# Patient Record
Sex: FEMALE | Race: WHITE | Employment: FULL TIME | URBAN - NONMETROPOLITAN AREA
[De-identification: names, ages, dates, MRNs, and addresses within clinical notes are randomized per-mention and may not be internally consistent; named-entity substitution may affect disease eponyms.]

---

## 2019-08-08 ENCOUNTER — TELEPHONE (OUTPATIENT)
Dept: OPTOMETRY | Age: 29
End: 2019-08-08

## 2019-09-16 ENCOUNTER — OFFICE VISIT (OUTPATIENT)
Dept: OPTOMETRY | Age: 29
End: 2019-09-16
Payer: COMMERCIAL

## 2019-09-16 DIAGNOSIS — H52.13 MYOPIA OF BOTH EYES WITH ASTIGMATISM: Primary | ICD-10-CM

## 2019-09-16 DIAGNOSIS — H52.203 MYOPIA OF BOTH EYES WITH ASTIGMATISM: Primary | ICD-10-CM

## 2019-09-16 PROCEDURE — 92004 COMPRE OPH EXAM NEW PT 1/>: CPT | Performed by: OPTOMETRIST

## 2019-09-16 ASSESSMENT — VISUAL ACUITY
OS_CC: 20/20
METHOD: SNELLEN - LINEAR
CORRECTION_TYPE: GLASSES
OD_CC+: -1

## 2019-09-16 ASSESSMENT — REFRACTION_MANIFEST
OD_SPHERE: -4.00
OD_AXIS: 180
OS_CYLINDER: -2.00
OS_SPHERE: -4.00
OS_AXIS: 170
OD_CYLINDER: -1.50

## 2019-09-16 ASSESSMENT — REFRACTION_WEARINGRX
OS_CYLINDER: -1.50
OD_CYLINDER: -1.25
SPECS_TYPE: SVL
OD_AXIS: 002
OS_AXIS: 169
OS_SPHERE: -4.00
OD_SPHERE: -3.75

## 2019-09-16 ASSESSMENT — REFRACTION_CURRENTRX
OD_SPHERE: -4.00
OD_BRAND: BIOFINITY TORIC
OS_AXIS: 170
OD_CYLINDER: -1.25
OS_BASECURVE: 8.7
OS_BRAND: BIOFINITY TORIC
OS_SPHERE: -4.00
OS_CYLINDER: -1.75
OD_BASECURVE: 8.7
OD_AXIS: 180

## 2019-09-16 ASSESSMENT — TONOMETRY
OD_IOP_MMHG: 19
IOP_METHOD: NON-CONTACT AIR PUFF
OS_IOP_MMHG: 17

## 2019-09-16 ASSESSMENT — SLIT LAMP EXAM - LIDS
COMMENTS: NORMAL
COMMENTS: NORMAL

## 2019-09-16 NOTE — PROGRESS NOTES
Topics Concern    None   Social History Narrative    None     History reviewed. No pertinent past medical history.     Neuro/Psych     Neuro/Psych     Oriented x3:  Yes    Mood/Affect:  Normal                Main Ophthalmology Exam     External Exam       Right Left    External Normal Normal          Slit Lamp Exam       Right Left    Lids/Lashes Normal Normal    Conjunctiva/Sclera White and quiet White and quiet    Cornea Clear Clear    Anterior Chamber Deep and quiet Deep and quiet    Iris Round and reactive Round and reactive    Lens Clear Clear    Vitreous Normal Normal          Fundus Exam       Right Left    Disc Normal Normal    C/D Ratio 0.1 0.1    Macula Normal Normal    Vessels Normal Normal    78 diopter                   Tonometry     Tonometry (Non-contact air puff, 1:12 PM)       Right Left    Pressure 19 17   IOP.9             17.2  CH:  10.4          10.4  WS: 6.9          8.6                     Visual Acuity (Snellen - Linear)       Right Left    Dist cc 20/25 -1 20/20    Correction:  Glasses         Not recorded          Ophthalmology Exam     Wearing Rx       Sphere Cylinder Axis    Right -3.75 -1.25 002    Left -4.00 -1.50 169    Age:  3yr    Type:  SVL                Manifest Refraction     Manifest Refraction       Sphere Cylinder Secor Dist VA    Right -4.00 -1.50 180 20/20    Left -4.00 -2.00 170 20/20          Manifest Refraction #2 (Auto)       Sphere Cylinder Secor Dist VA    Right -4.00 -1.75 179     Left -4.00 -2.25 169                  Final Rx       Sphere Cylinder Axis    Right -4.00 -1.50 180    Left -4.00 -2.00 170    Type:  SVL    Expiration Date:  2021           Contact Lens Current Rx     Current Contact Lens Rx (Ordered)       Brand Base Curve Sphere Cylinder Axis    Right Biofinity Toric 8.7 -4.00 -1.25 180    Left Biofinity Toric 8.7 -4.00 -1.75 170                FINAL   Final Contact Lens Rx       Brand Base Curve Sphere Cylinder Axis    Right Biofinity Toric 8.7

## 2019-11-13 LAB
ALT SERPL-CCNC: 23 U/L
AST SERPL-CCNC: 14 U/L
AVERAGE GLUCOSE: 111
BASOPHILS ABSOLUTE: NORMAL
BASOPHILS RELATIVE PERCENT: NORMAL
BUN BLDV-MCNC: 16 MG/DL
CALCIUM SERPL-MCNC: 9.2 MG/DL
CHLORIDE BLD-SCNC: 101 MMOL/L
CHOLESTEROL, TOTAL: 139 MG/DL
CHOLESTEROL/HDL RATIO: 3.7
CO2: 24 MMOL/L
CREAT SERPL-MCNC: 0.67 MG/DL
EOSINOPHILS ABSOLUTE: NORMAL
EOSINOPHILS RELATIVE PERCENT: NORMAL
GFR CALCULATED: >60
GLUCOSE BLD-MCNC: 90 MG/DL
HBA1C MFR BLD: 5.5 %
HCT VFR BLD CALC: 40.7 % (ref 36–46)
HDLC SERPL-MCNC: 38 MG/DL (ref 35–70)
HEMOGLOBIN: 13.1 G/DL (ref 12–16)
LDL CHOLESTEROL CALCULATED: 86 MG/DL (ref 0–160)
LYMPHOCYTES ABSOLUTE: NORMAL
LYMPHOCYTES RELATIVE PERCENT: NORMAL
MCH RBC QN AUTO: 26.2 PG
MCHC RBC AUTO-ENTMCNC: 32.3 G/DL
MCV RBC AUTO: 81.1 FL
MONOCYTES ABSOLUTE: NORMAL
MONOCYTES RELATIVE PERCENT: NORMAL
NEUTROPHILS ABSOLUTE: NORMAL
NEUTROPHILS RELATIVE PERCENT: NORMAL
PLATELET # BLD: 250 K/ΜL
PMV BLD AUTO: 9.1 FL
POTASSIUM SERPL-SCNC: 3.9 MMOL/L
RBC # BLD: 5.02 10^6/ΜL
SODIUM BLD-SCNC: 136 MMOL/L
TRIGL SERPL-MCNC: 76 MG/DL
TSH SERPL DL<=0.05 MIU/L-ACNC: 1.63 UIU/ML
VLDLC SERPL CALC-MCNC: NORMAL MG/DL
WBC # BLD: 11.3 10^3/ML

## 2019-11-15 ENCOUNTER — OFFICE VISIT (OUTPATIENT)
Dept: PRIMARY CARE CLINIC | Age: 29
End: 2019-11-15
Payer: COMMERCIAL

## 2019-11-15 ENCOUNTER — HOSPITAL ENCOUNTER (OUTPATIENT)
Age: 29
Setting detail: SPECIMEN
Discharge: HOME | End: 2019-11-15
Payer: COMMERCIAL

## 2019-11-15 VITALS
HEART RATE: 79 BPM | HEIGHT: 63 IN | BODY MASS INDEX: 34.2 KG/M2 | DIASTOLIC BLOOD PRESSURE: 70 MMHG | OXYGEN SATURATION: 99 % | SYSTOLIC BLOOD PRESSURE: 114 MMHG | WEIGHT: 193 LBS | RESPIRATION RATE: 16 BRPM | TEMPERATURE: 98.7 F

## 2019-11-15 DIAGNOSIS — J02.9 SORE THROAT: Primary | ICD-10-CM

## 2019-11-15 DIAGNOSIS — J02.9 SORE THROAT: ICD-10-CM

## 2019-11-15 LAB — S PYO AG THROAT QL: NORMAL

## 2019-11-15 PROCEDURE — 99213 OFFICE O/P EST LOW 20 MIN: CPT | Performed by: PHYSICIAN ASSISTANT

## 2019-11-15 PROCEDURE — 87880 STREP A ASSAY W/OPTIC: CPT | Performed by: PHYSICIAN ASSISTANT

## 2019-11-15 PROCEDURE — 87651 STREP A DNA AMP PROBE: CPT

## 2019-11-15 ASSESSMENT — ENCOUNTER SYMPTOMS
DIARRHEA: 0
COUGH: 1
WHEEZING: 0
TROUBLE SWALLOWING: 1
VOMITING: 0
SORE THROAT: 1
RHINORRHEA: 1
NAUSEA: 0
SHORTNESS OF BREATH: 0
CHEST TIGHTNESS: 0

## 2019-11-15 ASSESSMENT — PATIENT HEALTH QUESTIONNAIRE - PHQ9
SUM OF ALL RESPONSES TO PHQ9 QUESTIONS 1 & 2: 0
1. LITTLE INTEREST OR PLEASURE IN DOING THINGS: 0
SUM OF ALL RESPONSES TO PHQ QUESTIONS 1-9: 0
2. FEELING DOWN, DEPRESSED OR HOPELESS: 0
SUM OF ALL RESPONSES TO PHQ QUESTIONS 1-9: 0

## 2019-11-16 LAB
DIRECT EXAM: NORMAL
Lab: NORMAL
SPECIMEN DESCRIPTION: NORMAL

## 2019-11-18 ENCOUNTER — OFFICE VISIT (OUTPATIENT)
Dept: FAMILY MEDICINE CLINIC | Age: 29
End: 2019-11-18
Payer: COMMERCIAL

## 2019-11-18 ENCOUNTER — HOSPITAL ENCOUNTER (OUTPATIENT)
Dept: LAB | Age: 29
Discharge: HOME | End: 2019-11-18
Payer: COMMERCIAL

## 2019-11-18 VITALS
OXYGEN SATURATION: 99 % | BODY MASS INDEX: 34.02 KG/M2 | HEIGHT: 63 IN | WEIGHT: 192 LBS | HEART RATE: 71 BPM | SYSTOLIC BLOOD PRESSURE: 118 MMHG | DIASTOLIC BLOOD PRESSURE: 82 MMHG

## 2019-11-18 DIAGNOSIS — R53.83 FATIGUE, UNSPECIFIED TYPE: Primary | ICD-10-CM

## 2019-11-18 DIAGNOSIS — R53.83 FATIGUE, UNSPECIFIED TYPE: ICD-10-CM

## 2019-11-18 DIAGNOSIS — B00.1 HERPES LABIALIS: ICD-10-CM

## 2019-11-18 DIAGNOSIS — Z76.89 ENCOUNTER TO ESTABLISH CARE: ICD-10-CM

## 2019-11-18 LAB — VITAMIN D 25-HYDROXY: 24.7 NG/ML (ref 30–100)

## 2019-11-18 PROCEDURE — 99214 OFFICE O/P EST MOD 30 MIN: CPT | Performed by: NURSE PRACTITIONER

## 2019-11-18 PROCEDURE — 82306 VITAMIN D 25 HYDROXY: CPT

## 2019-11-18 PROCEDURE — 36415 COLL VENOUS BLD VENIPUNCTURE: CPT

## 2019-11-18 RX ORDER — CLOTRIMAZOLE AND BETAMETHASONE DIPROPIONATE 10; .64 MG/G; MG/G
CREAM TOPICAL
Qty: 1 TUBE | Refills: 1 | Status: SHIPPED | OUTPATIENT
Start: 2019-11-18

## 2019-11-18 RX ORDER — VALACYCLOVIR HYDROCHLORIDE 1 G/1
TABLET, FILM COATED ORAL
Qty: 4 TABLET | Refills: 3 | Status: SHIPPED | OUTPATIENT
Start: 2019-11-18 | End: 2020-06-11

## 2019-11-18 ASSESSMENT — ENCOUNTER SYMPTOMS
COUGH: 0
DIARRHEA: 0
NAUSEA: 0
VOMITING: 0
SHORTNESS OF BREATH: 0
WHEEZING: 0

## 2019-11-19 DIAGNOSIS — E55.9 VITAMIN D DEFICIENCY: Primary | ICD-10-CM

## 2019-12-11 ENCOUNTER — E-VISIT (OUTPATIENT)
Dept: FAMILY MEDICINE CLINIC | Age: 29
End: 2019-12-11
Payer: COMMERCIAL

## 2019-12-11 PROCEDURE — 99444 PR PHYSICIAN ONLINE EVALUATION & MANAGEMENT SERVICE: CPT | Performed by: NURSE PRACTITIONER

## 2019-12-11 RX ORDER — AZITHROMYCIN 250 MG/1
TABLET, FILM COATED ORAL
Qty: 1 PACKET | Refills: 0 | Status: SHIPPED | OUTPATIENT
Start: 2019-12-11 | End: 2019-12-16

## 2019-12-11 ASSESSMENT — LIFESTYLE VARIABLES: SMOKING_STATUS: NO, I'VE NEVER SMOKED

## 2019-12-16 ENCOUNTER — PATIENT MESSAGE (OUTPATIENT)
Dept: FAMILY MEDICINE CLINIC | Age: 29
End: 2019-12-16

## 2019-12-16 DIAGNOSIS — B00.1 HERPES LABIALIS: Primary | ICD-10-CM

## 2019-12-16 RX ORDER — VALACYCLOVIR HYDROCHLORIDE 1 G/1
1000 TABLET, FILM COATED ORAL 2 TIMES DAILY
Qty: 20 TABLET | Refills: 0 | Status: SHIPPED | OUTPATIENT
Start: 2019-12-16 | End: 2019-12-26

## 2019-12-31 ENCOUNTER — E-VISIT (OUTPATIENT)
Dept: FAMILY MEDICINE CLINIC | Age: 29
End: 2019-12-31
Payer: COMMERCIAL

## 2019-12-31 PROCEDURE — 98969 PR NONPHYSICIAN ONLINE ASSESSMENT AND MANAGEMENT: CPT | Performed by: NURSE PRACTITIONER

## 2020-01-02 RX ORDER — OMEPRAZOLE 20 MG/1
20 CAPSULE, DELAYED RELEASE ORAL DAILY
Qty: 30 CAPSULE | Refills: 11 | Status: SHIPPED | OUTPATIENT
Start: 2020-01-02 | End: 2021-05-25

## 2020-01-02 RX ORDER — OMEPRAZOLE 20 MG/1
20 CAPSULE, DELAYED RELEASE ORAL DAILY
Qty: 30 CAPSULE | Refills: 11 | Status: SHIPPED | OUTPATIENT
Start: 2020-01-02 | End: 2020-01-02 | Stop reason: CLARIF

## 2020-01-13 ENCOUNTER — PATIENT MESSAGE (OUTPATIENT)
Dept: FAMILY MEDICINE CLINIC | Age: 30
End: 2020-01-13

## 2020-01-13 RX ORDER — VALACYCLOVIR HYDROCHLORIDE 1 G/1
TABLET, FILM COATED ORAL
Qty: 4 TABLET | Refills: 3 | Status: CANCELLED | OUTPATIENT
Start: 2020-01-13

## 2020-01-13 RX ORDER — VALACYCLOVIR HYDROCHLORIDE 500 MG/1
500 TABLET, FILM COATED ORAL DAILY
Qty: 90 TABLET | Refills: 3 | Status: SHIPPED | OUTPATIENT
Start: 2020-01-13 | End: 2020-09-29 | Stop reason: SDUPTHER

## 2020-02-07 ENCOUNTER — NURSE ONLY (OUTPATIENT)
Dept: LAB | Age: 30
End: 2020-02-07
Payer: COMMERCIAL

## 2020-02-07 PROCEDURE — 96372 THER/PROPH/DIAG INJ SC/IM: CPT | Performed by: NURSE PRACTITIONER

## 2020-02-07 RX ORDER — KETOROLAC TROMETHAMINE 30 MG/ML
30 INJECTION, SOLUTION INTRAMUSCULAR; INTRAVENOUS ONCE
Status: COMPLETED | OUTPATIENT
Start: 2020-02-07 | End: 2020-02-07

## 2020-02-07 RX ADMIN — KETOROLAC TROMETHAMINE 30 MG: 30 INJECTION, SOLUTION INTRAMUSCULAR; INTRAVENOUS at 16:27

## 2020-03-31 ENCOUNTER — TELEMEDICINE (OUTPATIENT)
Dept: FAMILY MEDICINE CLINIC | Age: 30
End: 2020-03-31
Payer: COMMERCIAL

## 2020-03-31 PROCEDURE — 99213 OFFICE O/P EST LOW 20 MIN: CPT | Performed by: NURSE PRACTITIONER

## 2020-03-31 RX ORDER — VALACYCLOVIR HYDROCHLORIDE 1 G/1
TABLET, FILM COATED ORAL
Qty: 4 TABLET | Refills: 3 | Status: SHIPPED
Start: 2020-03-31 | End: 2020-07-10 | Stop reason: DRUGHIGH

## 2020-03-31 ASSESSMENT — ENCOUNTER SYMPTOMS: TROUBLE SWALLOWING: 0

## 2020-03-31 NOTE — PROGRESS NOTES
Not on file     Physically abused: Not on file     Forced sexual activity: Not on file   Other Topics Concern    Not on file   Social History Narrative    Not on file       Family History   Problem Relation Age of Onset    Diabetes Father     Diabetes Paternal Grandmother     Cancer Paternal Grandmother     Diabetes Paternal Grandfather     Thyroid Disease Paternal Grandfather     High Blood Pressure Paternal Grandfather     Cataracts Neg Hx     Glaucoma Neg Hx        No Known Allergies    Current Outpatient Medications   Medication Sig Dispense Refill    valACYclovir (VALTREX) 1 g tablet 2 TABS AT ONSET OF COLD SORE, REPEAT DOSE IN 12 HOURS ONCE 4 tablet 3    valACYclovir (VALTREX) 500 MG tablet Take 1 tablet by mouth daily 90 tablet 3    omeprazole (PRILOSEC) 20 MG delayed release capsule Take 1 capsule by mouth daily 30 capsule 11    clotrimazole-betamethasone (LOTRISONE) 1-0.05 % cream Apply topically 2 times daily. 1 Tube 1    valACYclovir (VALTREX) 1 g tablet 2 TABS AT ONSET OF COLD SORE, REPEAT DOSE IN 12 HOURS ONCE 4 tablet 3    ibuprofen (ADVIL;MOTRIN) 600 MG tablet Take 1 tablet by mouth every 6 hours as needed for Pain 30 tablet 0     No current facility-administered medications for this visit. Review of Systems   Constitutional: Negative for activity change, appetite change and fever. HENT: Negative for dental problem, drooling and trouble swallowing. Cold sore         Prior to Visit Medications    Medication Sig Taking?  Authorizing Provider   valACYclovir (VALTREX) 1 g tablet 2 TABS AT ONSET OF COLD SORE, REPEAT DOSE IN 12 HOURS ONCE Yes LAWANDA Paulino CNP   valACYclovir (VALTREX) 500 MG tablet Take 1 tablet by mouth daily  LAWANDA Paulino CNP   omeprazole (PRILOSEC) 20 MG delayed release capsule Take 1 capsule by mouth daily  LAWANDA Paulino CNP   clotrimazole-betamethasone (LOTRISONE) 1-0.05 % cream Apply topically 2 times daily. Ace Sung, APRN - CNP   valACYclovir (VALTREX) 1 g tablet 2 TABS AT ONSET OF COLD SORE, REPEAT DOSE IN 12 HOURS ONCE  LAWANDA Sylvester CNP   ibuprofen (ADVIL;MOTRIN) 600 MG tablet Take 1 tablet by mouth every 6 hours as needed for Pain  Nathalai Wagoner MD       Social History     Tobacco Use    Smoking status: Never Smoker    Smokeless tobacco: Never Used   Substance Use Topics    Alcohol use: Yes     Comment: occasionally    Drug use: Never            PHYSICAL EXAMINATION:  [ INSTRUCTIONS:  \"[x]\" Indicates a positive item  \"[]\" Indicates a negative item  -- DELETE ALL ITEMS NOT EXAMINED]  Vital Signs: (As obtained by patient/caregiver or practitioner observation)        Constitutional: [x] Appears well-developed and well-nourished [x] No apparent distress        Mental status  [x] Alert and awake  [] Oriented to person/place/time [x]Able to follow commands      Eyes:  EOM    [x]  Normal   Sclera  [x]  Normal           Discharge [x]  None visible      HENT:   [x] Normocephalic, atraumatic.     [x] Mouth/Throat: Mucous membranes are moist.     External Ears [x] Normal      Neck: [x] No visualized mass     Pulmonary/Chest: [x] Respiratory effort normal.  [x] No visualized signs of difficulty breathing or respiratory distress             Musculoskeletal:   [x] Normal gait with no signs of ataxia         [x] Normal range of motion of neck          Neurological:        [x] No Facial Asymmetry (Cranial nerve 7 motor function) (limited exam to video visit)          [x] No gaze palsy            Skin:              [] Abnormal- cold sore noted on upper lip midline           Psychiatric:       [x] Normal Affect [x] No Hallucinations       Other pertinent observable physical exam findings-     Due to this being a TeleHealth encounter, evaluation of the following organ systems is limited:

## 2020-04-23 ENCOUNTER — OFFICE VISIT (OUTPATIENT)
Dept: FAMILY MEDICINE CLINIC | Age: 30
End: 2020-04-23
Payer: COMMERCIAL

## 2020-04-23 VITALS
HEIGHT: 62 IN | SYSTOLIC BLOOD PRESSURE: 132 MMHG | DIASTOLIC BLOOD PRESSURE: 88 MMHG | TEMPERATURE: 97.9 F | HEART RATE: 78 BPM | BODY MASS INDEX: 35.48 KG/M2 | WEIGHT: 192.8 LBS

## 2020-04-23 PROCEDURE — 99213 OFFICE O/P EST LOW 20 MIN: CPT | Performed by: NURSE PRACTITIONER

## 2020-04-23 ASSESSMENT — ENCOUNTER SYMPTOMS
SHORTNESS OF BREATH: 0
VOMITING: 0
NAUSEA: 0
CONSTIPATION: 1
DIARRHEA: 0
ABDOMINAL DISTENTION: 0
COUGH: 0
ABDOMINAL PAIN: 1
BLOATING: 0
BACK PAIN: 0
WHEEZING: 0

## 2020-04-23 ASSESSMENT — PATIENT HEALTH QUESTIONNAIRE - PHQ9
SUM OF ALL RESPONSES TO PHQ QUESTIONS 1-9: 0
SUM OF ALL RESPONSES TO PHQ9 QUESTIONS 1 & 2: 0
SUM OF ALL RESPONSES TO PHQ QUESTIONS 1-9: 0
1. LITTLE INTEREST OR PLEASURE IN DOING THINGS: 0
2. FEELING DOWN, DEPRESSED OR HOPELESS: 0

## 2020-04-23 NOTE — PROGRESS NOTES
Intimate partner violence     Fear of current or ex partner: None     Emotionally abused: None     Physically abused: None     Forced sexual activity: None   Other Topics Concern    None   Social History Narrative    None       Family History   Problem Relation Age of Onset    Diabetes Father     Diabetes Paternal Grandmother     Cancer Paternal Grandmother     Diabetes Paternal Grandfather     Thyroid Disease Paternal Grandfather     High Blood Pressure Paternal Grandfather     Cataracts Neg Hx     Glaucoma Neg Hx        No Known Allergies    Current Outpatient Medications   Medication Sig Dispense Refill    valACYclovir (VALTREX) 1 g tablet 2 TABS AT ONSET OF COLD SORE, REPEAT DOSE IN 12 HOURS ONCE 4 tablet 3    valACYclovir (VALTREX) 500 MG tablet Take 1 tablet by mouth daily 90 tablet 3    omeprazole (PRILOSEC) 20 MG delayed release capsule Take 1 capsule by mouth daily 30 capsule 11    clotrimazole-betamethasone (LOTRISONE) 1-0.05 % cream Apply topically 2 times daily. 1 Tube 1    valACYclovir (VALTREX) 1 g tablet 2 TABS AT ONSET OF COLD SORE, REPEAT DOSE IN 12 HOURS ONCE 4 tablet 3    ibuprofen (ADVIL;MOTRIN) 600 MG tablet Take 1 tablet by mouth every 6 hours as needed for Pain 30 tablet 0     No current facility-administered medications for this visit. Review of Systems   Constitutional: Negative for activity change, appetite change and fever. Respiratory: Negative for cough, shortness of breath and wheezing. Cardiovascular: Negative for chest pain and palpitations. Gastrointestinal: Positive for abdominal pain and constipation. Negative for abdominal distention, bloating, diarrhea, nausea and vomiting. Genitourinary: Negative for difficulty urinating. Musculoskeletal: Negative for back pain. Objective:   Physical Exam  Vitals signs and nursing note reviewed. Constitutional:       Appearance: Normal appearance. She is obese.    HENT:      Head: Normocephalic and atraumatic. Cardiovascular:      Rate and Rhythm: Normal rate and regular rhythm. Heart sounds: Normal heart sounds. Pulmonary:      Effort: Pulmonary effort is normal.      Breath sounds: Normal breath sounds. Abdominal:      General: Bowel sounds are normal.      Tenderness: There is generalized abdominal tenderness. There is no guarding or rebound. Neurological:      Mental Status: She is alert. Assessment:       Diagnosis Orders   1.  Constipation, unspecified constipation type             Plan:      Colace 100mg BID until stools are soft and regular  miralax daily until stools are soft and regular  Push fluids  Exercise daily  May try lactulose if no improvement  Pt to return if symptoms do not improve or worsen   returN PRN         Nadja Roberson, APRN - CNP

## 2020-05-28 ENCOUNTER — E-VISIT (OUTPATIENT)
Dept: FAMILY MEDICINE CLINIC | Age: 30
End: 2020-05-28
Payer: COMMERCIAL

## 2020-05-28 ENCOUNTER — HOSPITAL ENCOUNTER (OUTPATIENT)
Age: 30
Setting detail: SPECIMEN
Discharge: HOME | End: 2020-05-28
Payer: COMMERCIAL

## 2020-05-28 LAB
-: ABNORMAL
AMORPHOUS: ABNORMAL
BACTERIA: ABNORMAL
BILIRUBIN URINE: NEGATIVE
CASTS UA: ABNORMAL /LPF (ref 0–2)
COLOR: ABNORMAL
COMMENT UA: ABNORMAL
CRYSTALS, UA: ABNORMAL /HPF
EPITHELIAL CELLS UA: ABNORMAL /HPF (ref 0–5)
GLUCOSE URINE: NEGATIVE
KETONES, URINE: NEGATIVE
LEUKOCYTE ESTERASE, URINE: NEGATIVE
MUCUS: ABNORMAL
NITRITE, URINE: NEGATIVE
OTHER OBSERVATIONS UA: ABNORMAL
PH UA: 7 (ref 5–6)
PROTEIN UA: NEGATIVE
RBC UA: ABNORMAL /HPF (ref 0–4)
RENAL EPITHELIAL, UA: ABNORMAL /HPF
SPECIFIC GRAVITY UA: 1.02 (ref 1.01–1.02)
TRICHOMONAS: ABNORMAL
TURBIDITY: ABNORMAL
URINE HGB: NEGATIVE
UROBILINOGEN, URINE: NORMAL
WBC UA: ABNORMAL /HPF (ref 0–4)
YEAST: ABNORMAL

## 2020-05-28 PROCEDURE — 81001 URINALYSIS AUTO W/SCOPE: CPT

## 2020-05-28 PROCEDURE — 98970 NQHP OL DIG ASSMT&MGMT 5-10: CPT | Performed by: NURSE PRACTITIONER

## 2020-05-29 RX ORDER — FLUCONAZOLE 150 MG/1
TABLET ORAL
Qty: 2 TABLET | Refills: 0 | Status: SHIPPED | OUTPATIENT
Start: 2020-05-29 | End: 2020-07-10 | Stop reason: ALTCHOICE

## 2020-06-11 ENCOUNTER — OFFICE VISIT (OUTPATIENT)
Dept: FAMILY MEDICINE CLINIC | Age: 30
End: 2020-06-11
Payer: COMMERCIAL

## 2020-06-11 VITALS
BODY MASS INDEX: 36.07 KG/M2 | DIASTOLIC BLOOD PRESSURE: 70 MMHG | SYSTOLIC BLOOD PRESSURE: 115 MMHG | HEIGHT: 62 IN | HEART RATE: 70 BPM | WEIGHT: 196 LBS

## 2020-06-11 PROCEDURE — 99213 OFFICE O/P EST LOW 20 MIN: CPT | Performed by: NURSE PRACTITIONER

## 2020-06-11 RX ORDER — PHENTERMINE HYDROCHLORIDE 37.5 MG/1
37.5 CAPSULE ORAL EVERY MORNING
Qty: 30 CAPSULE | Refills: 0 | Status: SHIPPED | OUTPATIENT
Start: 2020-06-11 | End: 2020-07-10 | Stop reason: SDUPTHER

## 2020-06-11 RX ORDER — PHENTERMINE HYDROCHLORIDE 37.5 MG/1
37.5 TABLET ORAL
Qty: 30 TABLET | Refills: 0 | Status: CANCELLED | OUTPATIENT
Start: 2020-06-11 | End: 2020-07-11

## 2020-06-11 ASSESSMENT — ENCOUNTER SYMPTOMS
WHEEZING: 0
SORE THROAT: 0
VOMITING: 0
SINUS PRESSURE: 0
CONSTIPATION: 0
BACK PAIN: 0
COUGH: 0
RHINORRHEA: 0
NAUSEA: 0
SHORTNESS OF BREATH: 0
ABDOMINAL PAIN: 0
DIARRHEA: 0

## 2020-06-11 NOTE — PROGRESS NOTES
Grandmother     Diabetes Paternal Grandfather     Thyroid Disease Paternal Grandfather     High Blood Pressure Paternal Grandfather     Cataracts Neg Hx     Glaucoma Neg Hx        No Known Allergies    Current Outpatient Medications   Medication Sig Dispense Refill    valACYclovir (VALTREX) 1 g tablet 2 TABS AT ONSET OF COLD SORE, REPEAT DOSE IN 12 HOURS ONCE 4 tablet 3    valACYclovir (VALTREX) 500 MG tablet Take 1 tablet by mouth daily 90 tablet 3    omeprazole (PRILOSEC) 20 MG delayed release capsule Take 1 capsule by mouth daily 30 capsule 11    clotrimazole-betamethasone (LOTRISONE) 1-0.05 % cream Apply topically 2 times daily. 1 Tube 1    fluconazole (DIFLUCAN) 150 MG tablet Take one daily now and repeat dose in 48 hrs. 2 tablet 0    ibuprofen (ADVIL;MOTRIN) 600 MG tablet Take 1 tablet by mouth every 6 hours as needed for Pain 30 tablet 0     No current facility-administered medications for this visit. Review of Systems   Constitutional: Negative for activity change, appetite change, fatigue and fever. HENT: Negative for congestion, ear discharge, ear pain, rhinorrhea, sinus pressure and sore throat. Respiratory: Negative for cough, shortness of breath and wheezing. Cardiovascular: Negative for chest pain, palpitations and leg swelling. Gastrointestinal: Negative for abdominal pain, constipation, diarrhea, nausea and vomiting. Musculoskeletal: Negative for back pain, joint swelling, neck pain and neck stiffness. Neurological: Negative for dizziness, light-headedness and headaches. Objective:   Physical Exam  Vitals signs and nursing note reviewed. Constitutional:       General: She is not in acute distress. Appearance: Normal appearance. She is well-developed. She is not diaphoretic. HENT:      Head: Normocephalic and atraumatic. Eyes:      Pupils: Pupils are equal, round, and reactive to light.    Neck:      Musculoskeletal: Normal range of motion and neck

## 2020-07-10 ENCOUNTER — OFFICE VISIT (OUTPATIENT)
Dept: FAMILY MEDICINE CLINIC | Age: 30
End: 2020-07-10
Payer: COMMERCIAL

## 2020-07-10 VITALS
OXYGEN SATURATION: 99 % | DIASTOLIC BLOOD PRESSURE: 70 MMHG | HEART RATE: 90 BPM | WEIGHT: 183.6 LBS | BODY MASS INDEX: 32.53 KG/M2 | HEIGHT: 63 IN | SYSTOLIC BLOOD PRESSURE: 120 MMHG

## 2020-07-10 PROCEDURE — 99213 OFFICE O/P EST LOW 20 MIN: CPT | Performed by: NURSE PRACTITIONER

## 2020-07-10 RX ORDER — PHENTERMINE HYDROCHLORIDE 37.5 MG/1
37.5 CAPSULE ORAL EVERY MORNING
Qty: 30 CAPSULE | Refills: 0 | Status: SHIPPED | OUTPATIENT
Start: 2020-07-10 | End: 2020-08-09

## 2020-07-10 ASSESSMENT — ENCOUNTER SYMPTOMS
DIARRHEA: 0
NAUSEA: 0
CONSTIPATION: 0
VOMITING: 0

## 2020-07-10 NOTE — PROGRESS NOTES
Subjective:      Patient ID: Vicente Valdivia is a 31 y.o. female. Pt presents to the clinic for a 4 week follow up of adipex use. She is finishing her 1st month of adipex. Pt is tolerating the medication well. She denies dizziness, diarrhea or nausea. She feels that her energy level has increased. She is walking 2-3 times a week for 30 minutes. She feels that the adipex is decreasing her appetite. She is trying to make healthier choices. She down about 13 pounds in 1 month. She has no further concerns.      Past Medical History:   Diagnosis Date    Chicken pox 12/1992    Hiatal hernia 2016    Vaginal delivery 2012       Past Surgical History:   Procedure Laterality Date    COLONOSCOPY  05/25/2016    [facility] [address] [facility] Miller  05/25/2016       Social History     Socioeconomic History    Marital status: Single     Spouse name: None    Number of children: None    Years of education: None    Highest education level: None   Occupational History    None   Social Needs    Financial resource strain: None    Food insecurity     Worry: None     Inability: None    Transportation needs     Medical: None     Non-medical: None   Tobacco Use    Smoking status: Never Smoker    Smokeless tobacco: Never Used   Substance and Sexual Activity    Alcohol use: Yes     Comment: occasionally    Drug use: Never    Sexual activity: Yes     Partners: Male     Comment: fiance   Lifestyle    Physical activity     Days per week: None     Minutes per session: None    Stress: None   Relationships    Social connections     Talks on phone: None     Gets together: None     Attends [employer] service: None     Active member of club or organization: None     Attends meetings of clubs or organizations: None     Relationship status: None    Intimate partner violence     Fear of current or ex partner: None     Emotionally abused: None     Physically abused: None     Forced sexual activity: None Other Topics Concern    None   Social History Narrative    None       Family History   Problem Relation Age of Onset    Diabetes Father     Diabetes Paternal Grandmother     Cancer Paternal Grandmother     Diabetes Paternal Grandfather     Thyroid Disease Paternal Grandfather     High Blood Pressure Paternal Grandfather     Cataracts Neg Hx     Glaucoma Neg Hx        No Known Allergies    Current Outpatient Medications   Medication Sig Dispense Refill    phentermine (ADIPEX-P) 37.5 MG capsule Take 1 capsule by mouth every morning for 30 days. 30 capsule 0    valACYclovir (VALTREX) 500 MG tablet Take 1 tablet by mouth daily 90 tablet 3    omeprazole (PRILOSEC) 20 MG delayed release capsule Take 1 capsule by mouth daily 30 capsule 11    clotrimazole-betamethasone (LOTRISONE) 1-0.05 % cream Apply topically 2 times daily. 1 Tube 1     No current facility-administered medications for this visit. Review of Systems   Constitutional: Positive for activity change (increasing) and appetite change (decreasing). Negative for fatigue and fever. Gastrointestinal: Negative for constipation, diarrhea, nausea and vomiting. Neurological: Negative for dizziness, light-headedness and headaches. Objective:   Physical Exam  Vitals signs and nursing note reviewed. Constitutional:       Appearance: Normal appearance. She is obese. HENT:      Head: Normocephalic and atraumatic. Cardiovascular:      Rate and Rhythm: Normal rate and regular rhythm. Heart sounds: Normal heart sounds. Pulmonary:      Effort: Pulmonary effort is normal.      Breath sounds: Normal breath sounds. Skin:     Capillary Refill: Capillary refill takes less than 2 seconds. Neurological:      General: No focal deficit present. Mental Status: She is alert and oriented to person, place, and time. Assessment:       Diagnosis Orders   1. Obesity (BMI 30-39. 9)             Plan:      Weight is down 13 pounds.  She is to continue current medication. Refill will be sent to Dr. Irish Lorenzana for signature. She is to return in 4 weeks for weight check. Encouraged pt to increase exercise.    Pt to return PRN         LAWANDA Wallis - KARY

## 2020-08-05 ENCOUNTER — EMPLOYEE WELLNESS (OUTPATIENT)
Dept: OTHER | Age: 30
End: 2020-08-05

## 2020-08-05 LAB
CHOLESTEROL/HDL RATIO: 3.8
CHOLESTEROL: 139 MG/DL
GLUCOSE BLD-MCNC: 100 MG/DL (ref 70–99)
HDLC SERPL-MCNC: 37 MG/DL
LDL CHOLESTEROL: 68 MG/DL (ref 0–130)
PATIENT FASTING?: YES
TRIGL SERPL-MCNC: 171 MG/DL
VLDLC SERPL CALC-MCNC: ABNORMAL MG/DL (ref 1–30)

## 2020-08-11 ENCOUNTER — TELEMEDICINE (OUTPATIENT)
Dept: FAMILY MEDICINE CLINIC | Age: 30
End: 2020-08-11
Payer: COMMERCIAL

## 2020-08-11 PROCEDURE — 99213 OFFICE O/P EST LOW 20 MIN: CPT | Performed by: NURSE PRACTITIONER

## 2020-08-11 RX ORDER — PHENTERMINE HYDROCHLORIDE 37.5 MG/1
37.5 CAPSULE ORAL EVERY MORNING
Qty: 30 CAPSULE | Refills: 0 | Status: SHIPPED | OUTPATIENT
Start: 2020-08-11 | End: 2020-09-10

## 2020-08-11 ASSESSMENT — ENCOUNTER SYMPTOMS
COUGH: 0
NAUSEA: 0
DIARRHEA: 0
WHEEZING: 0
SHORTNESS OF BREATH: 0
ABDOMINAL PAIN: 0
VOMITING: 0

## 2020-08-11 ASSESSMENT — PATIENT HEALTH QUESTIONNAIRE - PHQ9
SUM OF ALL RESPONSES TO PHQ QUESTIONS 1-9: 2
1. LITTLE INTEREST OR PLEASURE IN DOING THINGS: 1
SUM OF ALL RESPONSES TO PHQ9 QUESTIONS 1 & 2: 2
2. FEELING DOWN, DEPRESSED OR HOPELESS: 1
SUM OF ALL RESPONSES TO PHQ QUESTIONS 1-9: 2

## 2020-08-11 NOTE — PROGRESS NOTES
Active member of club or organization: Not on file     Attends meetings of clubs or organizations: Not on file     Relationship status: Not on file    Intimate partner violence     Fear of current or ex partner: Not on file     Emotionally abused: Not on file     Physically abused: Not on file     Forced sexual activity: Not on file   Other Topics Concern    Not on file   Social History Narrative    Not on file       Family History   Problem Relation Age of Onset    Diabetes Father     Diabetes Paternal Grandmother     Cancer Paternal Grandmother     Diabetes Paternal Grandfather     Thyroid Disease Paternal Grandfather     High Blood Pressure Paternal Grandfather     Cataracts Neg Hx     Glaucoma Neg Hx        No Known Allergies    Current Outpatient Medications   Medication Sig Dispense Refill    valACYclovir (VALTREX) 500 MG tablet Take 1 tablet by mouth daily 90 tablet 3    omeprazole (PRILOSEC) 20 MG delayed release capsule Take 1 capsule by mouth daily 30 capsule 11    clotrimazole-betamethasone (LOTRISONE) 1-0.05 % cream Apply topically 2 times daily. 1 Tube 1     No current facility-administered medications for this visit. Review of Systems   Constitutional: Negative for activity change, appetite change and fever. Respiratory: Negative for cough, shortness of breath and wheezing. Cardiovascular: Negative for chest pain and palpitations. Gastrointestinal: Negative for abdominal pain, diarrhea, nausea and vomiting. Neurological: Negative for dizziness, light-headedness and headaches. Prior to Visit Medications    Medication Sig Taking? Authorizing Provider   valACYclovir (VALTREX) 500 MG tablet Take 1 tablet by mouth daily  LAWANDA Gutierrez CNP   omeprazole (PRILOSEC) 20 MG delayed release capsule Take 1 capsule by mouth daily  LAWANDA Gutierrez CNP   clotrimazole-betamethasone (LOTRISONE) 1-0.05 % cream Apply topically 2 times daily. Duane Sine, APRN - CNP       Social History     Tobacco Use    Smoking status: Never Smoker    Smokeless tobacco: Never Used   Substance Use Topics    Alcohol use: Yes     Comment: occasionally    Drug use: Never            PHYSICAL EXAMINATION:  [ INSTRUCTIONS:  \"[x]\" Indicates a positive item  \"[]\" Indicates a negative item  -- DELETE ALL ITEMS NOT EXAMINED]  Vital Signs: (As obtained by patient/caregiver or practitioner observation)    Blood pressure- 122/78 Heart rate-83       Temperature-97.8  Pulse oximetry- 100%    Constitutional: [x] Appears well-developed and well-nourished [x] No apparent distress      [] Abnormal-   Mental status  [x] Alert and awake  [x] Oriented to person/place/time [x]Able to follow commands      Eyes:  EOM    [x]  Normal  [] Abnormal-  Sclera  [x]  Normal  [] Abnormal -         Discharge [x]  None visible  [] Abnormal -    HENT:   [x] Normocephalic, atraumatic. [] Abnormal   [x] Mouth/Throat: Mucous membranes are moist.     External Ears [x] Normal  [] Abnormal-     Neck: [x] No visualized mass     Pulmonary/Chest: [x] Respiratory effort normal.  [x] No visualized signs of difficulty breathing or respiratory distress        [] Abnormal-      Musculoskeletal:   [x] Normal gait with no signs of ataxia         [x] Normal range of motion of neck        [] Abnormal-       Neurological:        [x] No Facial Asymmetry (Cranial nerve 7 motor function) (limited exam to video visit)          [x] No gaze palsy        [] Abnormal-         Skin:        [x] No significant exanthematous lesions or discoloration noted on facial skin         [] Abnormal-            Psychiatric:       [x] Normal Affect [x] No Hallucinations        [] Abnormal-         ASSESSMENT/PLAN:  1. Obesity (BMI 30-39. 9)  Pt continues to show weight loss progress. She is to continue the last month of adipex.    Encouraged patient to work on healthy diet and daily cardio   Pt to return in 1 month sooner if

## 2020-08-18 ENCOUNTER — TELEPHONE (OUTPATIENT)
Dept: PRIMARY CARE CLINIC | Age: 30
End: 2020-08-18

## 2020-09-16 ENCOUNTER — TELEPHONE (OUTPATIENT)
Dept: OPTOMETRY | Age: 30
End: 2020-09-16

## 2020-09-16 NOTE — TELEPHONE ENCOUNTER
Patient asked for trial contacts to get her by until her appointment on 10/05/2020. Patients contacts are not in [address] [place] [address], [address] [place] [address] for Astigmatism was given in their place. Patient was called and message was left explaining that we have similar contacts for her to .

## 2020-09-29 RX ORDER — VALACYCLOVIR HYDROCHLORIDE 500 MG/1
500 TABLET, FILM COATED ORAL DAILY
Qty: 90 TABLET | Refills: 3 | Status: SHIPPED | OUTPATIENT
Start: 2020-09-29

## 2020-09-29 RX ORDER — VALACYCLOVIR HYDROCHLORIDE 1 G/1
TABLET, FILM COATED ORAL
Qty: 4 TABLET | Refills: 3 | Status: SHIPPED | OUTPATIENT
Start: 2020-09-29 | End: 2021-08-09 | Stop reason: SDUPTHER

## 2020-09-29 NOTE — TELEPHONE ENCOUNTER
Indira called requesting a refill of the below medication which has been pended for you:     Requested Prescriptions     Pending Prescriptions Disp Refills    valACYclovir (VALTREX) 500 MG tablet 90 tablet 3     Sig: Take 1 tablet by mouth daily       Last Appointment Date: 8/11/2020  Next Appointment Date: Visit date not found    No Known Allergies

## 2020-10-05 ENCOUNTER — OFFICE VISIT (OUTPATIENT)
Dept: OPTOMETRY | Age: 30
End: 2020-10-05
Payer: COMMERCIAL

## 2020-10-05 PROBLEM — H52.13 MYOPIA OF BOTH EYES WITH ASTIGMATISM: Status: ACTIVE | Noted: 2020-10-05

## 2020-10-05 PROBLEM — H52.203 MYOPIA OF BOTH EYES WITH ASTIGMATISM: Status: ACTIVE | Noted: 2020-10-05

## 2020-10-05 PROCEDURE — 92014 COMPRE OPH EXAM EST PT 1/>: CPT | Performed by: OPTOMETRIST

## 2020-10-05 ASSESSMENT — KERATOMETRY
OS_AXISANGLE_DEGREES: 80
OS_AXISANGLE2_DEGREES: 170
METHOD_AUTO_MANUAL: AUTO
OS_K2POWER_DIOPTERS: 46.25
OS_K1POWER_DIOPTERS: 43.75

## 2020-10-05 ASSESSMENT — REFRACTION_CURRENTRX
OD_SPHERE: -4.00
OD_AXIS: 180
OD_BRAND: BIOFINITY TORIC
OS_AXIS: 170
OD_BASECURVE: 8.7
OS_CYLINDER: -1.75
OS_SPHERE: -4.00
OS_BASECURVE: 8.7
OD_CYLINDER: -1.25
OS_BRAND: BIOFINITY TORIC

## 2020-10-05 ASSESSMENT — VISUAL ACUITY
OD_CC: 20/20 OU
METHOD: SNELLEN - LINEAR
CORRECTION_TYPE: GLASSES
OS_CC: 20/20
OD_PH_CC: 20/20 OU

## 2020-10-05 ASSESSMENT — REFRACTION_WEARINGRX
OS_CYLINDER: -2.00
OS_AXIS: 170
OD_AXIS: 180
OD_CYLINDER: -1.50
OS_SPHERE: -4.00
OD_SPHERE: -4.00

## 2020-10-05 ASSESSMENT — REFRACTION_MANIFEST
OS_CYLINDER: -2.25
OD_AXIS: 180
OD_CYLINDER: -1.50
OS_SPHERE: -4.00
OS_AXIS: 169
OD_SPHERE: -4.00

## 2020-10-05 ASSESSMENT — SLIT LAMP EXAM - LIDS
COMMENTS: NORMAL
COMMENTS: NORMAL

## 2020-10-05 ASSESSMENT — TONOMETRY
IOP_METHOD: NON-CONTACT AIR PUFF
OS_IOP_MMHG: 15
OD_IOP_MMHG: 19

## 2020-10-05 NOTE — PATIENT INSTRUCTIONS
rx printed for glasses    We will order contact lenses trials in the ultra for astigmatism and call you when they come in;    Let us know if you like them and want to order or if you want to order the biofinity toric as before     Try Supraclens daily protein remover if desired

## 2020-10-05 NOTE — PROGRESS NOTES
Xavi Babb presents today for   Chief Complaint   Patient presents with   [facility]   [facility] Exam   .    HPI     Last Vision Exam: 9/2019  Last Ophthalmology Exam: na  Last Filled Glasses Rx: 2019  Insurance: [employer]  Update: glasses and contacts. Would like glasses Rx to take with her. Patient reports dry eyes at times. No eye drops used at this time. Has been wearing the contact lenses more than used to   We will try the ultra for astigmatism with trials;   And see if they are more comfortable;  May go back to [facility] if want to                Family History   Problem Relation Age of Onset    Diabetes Father     Diabetes Paternal Grandmother     Cancer Paternal Grandmother     Diabetes Paternal Grandfather     Thyroid Disease Paternal Grandfather     High Blood Pressure Paternal Grandfather     Cataracts Neg Hx     Glaucoma Neg Hx        Social History     Socioeconomic History    Marital status: Single     Spouse name: None    Number of children: None    Years of education: None    Highest education level: None   Occupational History    None   Social Needs    Financial resource strain: None    Food insecurity     Worry: None     Inability: None    Transportation needs     Medical: None     Non-medical: None   Tobacco Use    Smoking status: Never Smoker    Smokeless tobacco: Never Used   Substance and Sexual Activity    Alcohol use: Yes     Comment: occasionally    Drug use: Never    Sexual activity: Yes     Partners: Male     Comment: fiance   Lifestyle    Physical activity     Days per week: None     Minutes per session: None    Stress: None   Relationships    Social connections     Talks on phone: None     Gets together: None     Attends Taoism service: None     Active member of club or organization: None     Attends meetings of clubs or organizations: None     Relationship status: None    Intimate partner violence     Fear of current or ex partner: None Emotionally abused: None     Physically abused: None     Forced sexual activity: None   Other Topics Concern    None   Social History Narrative    None     Past Medical History:   Diagnosis Date    Chicken pox 1992    Hiatal hernia 2016    Vaginal delivery 2012       Neuro/Psych     Neuro/Psych     Oriented x3:  Yes    Mood/Affect:  Normal                Main Ophthalmology Exam     External Exam       Right Left    External Normal Normal          Slit Lamp Exam       Right Left    Lids/Lashes Normal Normal    Conjunctiva/Sclera White and quiet White and quiet    Cornea Clear Clear    Anterior Chamber Deep and quiet Deep and quiet    Iris Round and reactive Round and reactive    Lens Clear Clear    Vitreous Normal Normal          Fundus Exam       Right Left    Disc Normal Normal    C/D Ratio 0.1 0.1    Macula Normal Normal    Vessels Normal Normal                  Tonometry     Tonometry (Non-contact air puff, 10:57 AM)       Right Left    Pressure 19 15   IOP.5             14.4  CH:  10.2           10.2           WS:7.8           7.7                     Visual Acuity (Snellen - Linear)       Right Left    Dist cc 20/20 20/20    Dist ph cc 20/20 OU     Near cc 20/20 OU     Correction:  Glasses        Keratometry     Keratometry (auto)       K1 Axis K2 Axis    Right        Left 43.75 170 46.25 80                Ophthalmology Exam     Wearing Rx       Sphere Cylinder Axis    Right -4.00 -1.50 180    Left -4.00 -2.00 170    Age:  [age]                Manifest Refraction     Manifest Refraction       Sphere Cylinder Ovando Dist VA    Right -4.00 -1.50 180 20/20    Left -4.00 -2.25 169 20/20          Manifest Refraction #2 (Auto)       Sphere Cylinder Ovando Dist VA    Right -4.00 -2.00 178     Left -4.00 -2.50 168                  Final Rx       Sphere Cylinder Axis    Right -4.00 -1.50 180    Left -4.00 -2.25 169    Type:  SVL    Expiration Date:  10/6/2022           Contact Lens Current Rx     Current Contact Lens Rx       Brand Base Curve Sphere Cylinder Axis    Right Biofinity Toric 8.7 -4.00 -1.25 180    Left Biofinity Toric 8.7 -4.00 -1.75 170          Current Contact Lens Rx #2       Brand Base Curve Sphere Cylinder Axis    Right B and L ultra for astigmatism  8.6 -4.00 -1.25 180    Left B and L ultra for astigmatism  8.6 -4.00 -1.75 170                We will order trials in ultra for astigmatism        Plan:     1.  Myopia of both eyes with astigmatism             Patient Instructions   rx printed for glasses    We will order contact lenses trials in the ultra for astigmatism and call you when they come in;    Let us know if you like them and want to order or if you want to order the biofinity toric as before     Try Supraclens daily protein remover if desired      Return in about 1 year (around 10/5/2021) for complete eye exam.

## 2020-10-19 VITALS — WEIGHT: 183 LBS | BODY MASS INDEX: 32.94 KG/M2

## 2020-11-05 ENCOUNTER — OFFICE VISIT (OUTPATIENT)
Dept: FAMILY MEDICINE CLINIC | Age: 30
End: 2020-11-05
Payer: COMMERCIAL

## 2020-11-05 VITALS
HEIGHT: 63 IN | BODY MASS INDEX: 32.07 KG/M2 | WEIGHT: 181 LBS | DIASTOLIC BLOOD PRESSURE: 74 MMHG | HEART RATE: 88 BPM | SYSTOLIC BLOOD PRESSURE: 122 MMHG

## 2020-11-05 PROCEDURE — 99213 OFFICE O/P EST LOW 20 MIN: CPT | Performed by: NURSE PRACTITIONER

## 2020-11-05 RX ORDER — BUSPIRONE HYDROCHLORIDE 10 MG/1
10 TABLET ORAL 3 TIMES DAILY
Qty: 90 TABLET | Refills: 3 | Status: SHIPPED | OUTPATIENT
Start: 2020-11-05 | End: 2020-12-05

## 2020-11-05 ASSESSMENT — ENCOUNTER SYMPTOMS
COUGH: 0
WHEEZING: 0
SHORTNESS OF BREATH: 0

## 2020-11-05 NOTE — PROGRESS NOTES
2020     Tigre Rahman (:  1990) is a 31 y.o. female, here for evaluation of the following medical concerns:    HPI  Pt presents to the clinic to discuss starting a medication for acute anxiety. She feels that the stresses from her work are causing an increase in anxiety. She denies thoughts of suicide or homicide. She states that she has struggled with depression in the past and does not feel that it is depression related it is much more anxiety. She does not want to take a medication on a daily basis. She would like something to use PRN. She has tried buspar in the past which seemed to work.   Past Medical History:   Diagnosis Date    Chicken pox 1992    Hiatal hernia 2016    Vaginal delivery        Past Surgical History:   Procedure Laterality Date    COLONOSCOPY  2016    Barbara Kwok  2016       Social History     Socioeconomic History    Marital status:      Spouse name: None    Number of children: None    Years of education: None    Highest education level: None   Occupational History    None   Social Needs    Financial resource strain: None    Food insecurity     Worry: None     Inability: None    Transportation needs     Medical: None     Non-medical: None   Tobacco Use    Smoking status: Never Smoker    Smokeless tobacco: Never Used   Substance and Sexual Activity    Alcohol use: Yes     Comment: occasionally    Drug use: Never    Sexual activity: Yes     Partners: Male     Comment: fiance   Lifestyle    Physical activity     Days per week: None     Minutes per session: None    Stress: None   Relationships    Social connections     Talks on phone: None     Gets together: None     Attends Orthodoxy service: None     Active member of club or organization: None     Attends meetings of clubs or organizations: None     Relationship status: None    Intimate partner violence     Fear of current or ex partner: None     Emotionally abused: None     Physically abused: None     Forced sexual activity: None   Other Topics Concern    None   Social History Narrative    None       Family History   Problem Relation Age of Onset    Diabetes Father     Diabetes Paternal Grandmother     Cancer Paternal Grandmother     Diabetes Paternal Grandfather     Thyroid Disease Paternal Grandfather     High Blood Pressure Paternal Grandfather     Cataracts Neg Hx     Glaucoma Neg Hx        No Known Allergies    Current Outpatient Medications   Medication Sig Dispense Refill    busPIRone (BUSPAR) 10 MG tablet Take 1 tablet by mouth 3 times daily 90 tablet 3    valACYclovir (VALTREX) 500 MG tablet Take 1 tablet by mouth daily 90 tablet 3    valACYclovir (VALTREX) 1 g tablet 2 TABS AT ONSET OF COLD SORE, REPEAT DOSE IN 12 HOURS ONCE 4 tablet 3    omeprazole (PRILOSEC) 20 MG delayed release capsule Take 1 capsule by mouth daily 30 capsule 11    clotrimazole-betamethasone (LOTRISONE) 1-0.05 % cream Apply topically 2 times daily. 1 Tube 1     No current facility-administered medications for this visit. Review of Systems   Constitutional: Negative for activity change, appetite change and fever. Respiratory: Negative for cough, shortness of breath and wheezing. Cardiovascular: Negative for chest pain and palpitations. Psychiatric/Behavioral: Positive for sleep disturbance. Negative for self-injury. The patient is nervous/anxious. Prior to Visit Medications    Medication Sig Taking?  Authorizing Provider   busPIRone (BUSPAR) 10 MG tablet Take 1 tablet by mouth 3 times daily Yes LAWANDA Singh CNP   valACYclovir (VALTREX) 500 MG tablet Take 1 tablet by mouth daily Yes LAWANDA Singh CNP   valACYclovir (VALTREX) 1 g tablet 2 TABS AT ONSET OF COLD SORE, REPEAT DOSE IN 12 HOURS ONCE Yes LAWANDA Singh CNP   omeprazole (PRILOSEC) 20 MG delayed release capsule Take 1 capsule by mouth daily Yes LAWANDA Anna CNP   clotrimazole-betamethasone (LOTRISONE) 1-0.05 % cream Apply topically 2 times daily. Yes LAWANDA Anna CNP        Social History     Tobacco Use    Smoking status: Never Smoker    Smokeless tobacco: Never Used   Substance Use Topics    Alcohol use: Yes     Comment: occasionally        Vitals:    11/05/20 1421   BP: 122/74   Site: Right Upper Arm   Position: Sitting   Cuff Size: Medium Adult   Pulse: 88   Weight: 181 lb (82.1 kg)   Height: 5' 2.5\" (1.588 m)     Estimated body mass index is 32.58 kg/m² as calculated from the following:    Height as of this encounter: 5' 2.5\" (1.588 m). Weight as of this encounter: 181 lb (82.1 kg). Physical Exam  Vitals signs and nursing note reviewed. Constitutional:       Appearance: Normal appearance. HENT:      Head: Normocephalic and atraumatic. Cardiovascular:      Rate and Rhythm: Normal rate and regular rhythm. Heart sounds: Normal heart sounds. Pulmonary:      Effort: Pulmonary effort is normal.      Breath sounds: Normal breath sounds. Skin:     Capillary Refill: Capillary refill takes less than 2 seconds. Neurological:      General: No focal deficit present. Mental Status: She is alert and oriented to person, place, and time. Psychiatric:         Mood and Affect: Mood is anxious. Behavior: Behavior normal.         Thought Content: Thought content normal.         Cognition and Memory: Cognition and memory normal.         Judgment: Judgment normal.         ASSESSMENT/PLAN:  1. Situational anxiety  Will start buspar 10mg TID PRN   Pt to return in 3 months for follow up sooner if needed. No follow-ups on file. An electronic signature was used to authenticate this note.     --LAWANDA Anna CNP on 11/5/2020 at 2:43 PM

## 2020-11-06 ENCOUNTER — TELEPHONE (OUTPATIENT)
Dept: OPTOMETRY | Age: 30
End: 2020-11-06

## 2020-11-06 NOTE — TELEPHONE ENCOUNTER
Contacts she is using are not working for her.  Would like to go back to the biofinity toric    Contact Lens Current Rx                Current Contact Lens Rx                  Brand Base Curve Sphere Cylinder Axis      Right Biofinity Toric 8.7 -4.00 -1.25 180      Left Biofinity Toric 8.7 -4.00 -1.75 170

## 2020-11-11 NOTE — TELEPHONE ENCOUNTER
Patient would like to order 1 box each eye of the Biofinity Toric ; use ins if available. Did not like the fit and feel of the B&L Ultra for Astigmatism.

## 2020-12-04 ENCOUNTER — HOSPITAL ENCOUNTER (OUTPATIENT)
Age: 30
Setting detail: SPECIMEN
Discharge: HOME | End: 2020-12-04
Payer: COMMERCIAL

## 2020-12-04 LAB
-: ABNORMAL
AMORPHOUS: ABNORMAL
BACTERIA: ABNORMAL
BILIRUBIN URINE: NEGATIVE
CASTS UA: ABNORMAL /LPF (ref 0–2)
COLOR: ABNORMAL
COMMENT UA: ABNORMAL
CRYSTALS, UA: ABNORMAL /HPF
EPITHELIAL CELLS UA: ABNORMAL /HPF (ref 0–5)
GLUCOSE URINE: NEGATIVE
KETONES, URINE: NEGATIVE
LEUKOCYTE ESTERASE, URINE: NEGATIVE
MUCUS: ABNORMAL
NITRITE, URINE: NEGATIVE
OTHER OBSERVATIONS UA: ABNORMAL
PH UA: 6 (ref 5–6)
PROTEIN UA: NEGATIVE
RBC UA: ABNORMAL /HPF (ref 0–4)
RENAL EPITHELIAL, UA: ABNORMAL /HPF
SPECIFIC GRAVITY UA: 1.03 (ref 1.01–1.02)
TRICHOMONAS: ABNORMAL
TURBIDITY: ABNORMAL
URINE HGB: NEGATIVE
UROBILINOGEN, URINE: NORMAL
WBC UA: ABNORMAL /HPF (ref 0–4)
YEAST: ABNORMAL

## 2020-12-04 PROCEDURE — 81001 URINALYSIS AUTO W/SCOPE: CPT

## 2021-01-13 ENCOUNTER — OFFICE VISIT (OUTPATIENT)
Dept: OBGYN | Age: 31
End: 2021-01-13
Payer: COMMERCIAL

## 2021-01-13 ENCOUNTER — TELEPHONE (OUTPATIENT)
Dept: FAMILY MEDICINE CLINIC | Age: 31
End: 2021-01-13

## 2021-01-13 ENCOUNTER — VIRTUAL VISIT (OUTPATIENT)
Dept: FAMILY MEDICINE CLINIC | Age: 31
End: 2021-01-13

## 2021-01-13 ENCOUNTER — HOSPITAL ENCOUNTER (OUTPATIENT)
Dept: LAB | Age: 31
Discharge: HOME | End: 2021-01-13

## 2021-01-13 VITALS
BODY MASS INDEX: 32.64 KG/M2 | HEIGHT: 63 IN | HEART RATE: 76 BPM | OXYGEN SATURATION: 98 % | WEIGHT: 184.2 LBS | DIASTOLIC BLOOD PRESSURE: 80 MMHG | SYSTOLIC BLOOD PRESSURE: 116 MMHG

## 2021-01-13 DIAGNOSIS — E66.9 OBESITY (BMI 30-39.9): ICD-10-CM

## 2021-01-13 DIAGNOSIS — R68.83 CHILLS: Primary | ICD-10-CM

## 2021-01-13 DIAGNOSIS — Z11.3 ROUTINE SCREENING FOR STI (SEXUALLY TRANSMITTED INFECTION): ICD-10-CM

## 2021-01-13 DIAGNOSIS — Z01.419 WOMEN'S ANNUAL ROUTINE GYNECOLOGICAL EXAMINATION: Primary | ICD-10-CM

## 2021-01-13 DIAGNOSIS — Z12.4 SCREENING FOR MALIGNANT NEOPLASM OF CERVIX: ICD-10-CM

## 2021-01-13 DIAGNOSIS — R10.2 PELVIC PAIN: ICD-10-CM

## 2021-01-13 LAB
GLUCOSE FASTING: 86 MG/DL (ref 70–99)
INSULIN COMMENT: NORMAL
INSULIN REFERENCE RANGE:: NORMAL
INSULIN: 15.4 MU/L
THYROXINE, FREE: 1.25 NG/DL (ref 0.93–1.7)
TSH SERPL DL<=0.05 MIU/L-ACNC: 1.26 MIU/L (ref 0.3–5)

## 2021-01-13 PROCEDURE — 87591 N.GONORRHOEAE DNA AMP PROB: CPT

## 2021-01-13 PROCEDURE — 84439 ASSAY OF FREE THYROXINE: CPT

## 2021-01-13 PROCEDURE — 83525 ASSAY OF INSULIN: CPT

## 2021-01-13 PROCEDURE — 36415 COLL VENOUS BLD VENIPUNCTURE: CPT

## 2021-01-13 PROCEDURE — 87491 CHLMYD TRACH DNA AMP PROBE: CPT

## 2021-01-13 PROCEDURE — 99211 OFF/OP EST MAY X REQ PHY/QHP: CPT

## 2021-01-13 PROCEDURE — 99213 OFFICE O/P EST LOW 20 MIN: CPT | Performed by: PHYSICIAN ASSISTANT

## 2021-01-13 PROCEDURE — 84443 ASSAY THYROID STIM HORMONE: CPT

## 2021-01-13 PROCEDURE — 82947 ASSAY GLUCOSE BLOOD QUANT: CPT

## 2021-01-13 PROCEDURE — G0145 SCR C/V CYTO,THINLAYER,RESCR: HCPCS

## 2021-01-13 PROCEDURE — 99385 PREV VISIT NEW AGE 18-39: CPT | Performed by: ADVANCED PRACTICE MIDWIFE

## 2021-01-13 ASSESSMENT — PATIENT HEALTH QUESTIONNAIRE - PHQ9
SUM OF ALL RESPONSES TO PHQ QUESTIONS 1-9: 0
SUM OF ALL RESPONSES TO PHQ QUESTIONS 1-9: 0
SUM OF ALL RESPONSES TO PHQ9 QUESTIONS 1 & 2: 0
1. LITTLE INTEREST OR PLEASURE IN DOING THINGS: 0
SUM OF ALL RESPONSES TO PHQ QUESTIONS 1-9: 0

## 2021-01-13 ASSESSMENT — ENCOUNTER SYMPTOMS
GASTROINTESTINAL NEGATIVE: 1
RESPIRATORY NEGATIVE: 1
EYES NEGATIVE: 1
RESPIRATORY NEGATIVE: 1

## 2021-01-13 NOTE — LETTER
[facility] of [place] [address]  Phone: [phone]  Fax: [phone]    Staci Hurtadoma        January 13, 2021     Patient: Shabana Moser   YOB: 1990   Date of Visit: 1/13/2021       To Whom It May Concern: It is my medical opinion that Shabana Moser may return to work on 1/14/2021. If you have any questions or concerns, please don't hesitate to call.     Sincerely,        ALLAN Hurtado

## 2021-01-13 NOTE — PROGRESS NOTES
Farrah Solano is a 31 y.o. female that presents for Other (negative covid chills headache and body aches yesterday)      This visit was performed via a video visit in patient home. Provider performing visit from office with assistance of nursing personnel, Nanette Walker LPN.    HPI:  Patient is seen for RTW evaluation. Patient says that she woke yesterday with chills and myalgia. Temperature was 98.6. She had a headache which has improved. She denies cough, congestion, nausea, vomiting, diarrhea, loss of taste or smell. Patient had COVID testing yesterday which returned negative today. I have reviewed the patient's past medical history, past surgical history, allergies,medications, social and family history and I have made updates where appropriate. Past Medical History:   Diagnosis Date    Abnormal Pap smear of cervix 09/22/2014    HPV    Breast disorder     sore breasts increased caffeine use    Chicken pox 12/1992    Depression     Herpes simplex virus (HSV) infection     Hiatal hernia 2016    Postpartum depression     Rh sensitized     Vaginal delivery 2012       Past Surgical History:   Procedure Laterality Date    [facility]/25/2016    [facility]  [facility]/25/2016       Social History     Tobacco Use    Smoking status: Never Smoker    Smokeless tobacco: Never Used   Substance Use Topics    Alcohol use: Yes     Comment: occasionally    Drug use: Never       Family History   Problem Relation Age of Onset    Diabetes Father     Diabetes Paternal Grandmother     Cancer Paternal Grandmother     Diabetes Paternal Grandfather     Thyroid Disease Paternal Grandfather     High Blood Pressure Paternal Grandfather     Cataracts Neg Hx     Glaucoma Neg Hx          Review of Systems   Constitutional: Positive for chills. Negative for fatigue and fever. HENT: Negative. Respiratory: Negative. Cardiovascular: Negative. Neurological: Positive for headaches. PHYSICAL EXAM:    Physical Exam  HENT:      Head: Normocephalic. Pulmonary:      Effort: No respiratory distress. Neurological:      General: No focal deficit present. Mental Status: She is alert and oriented to person, place, and time. Psychiatric:         Mood and Affect: Mood normal.          ASSESSMENT & PLAN  Indira was seen today for other. Diagnoses and all orders for this visit:    Chills    Negative COVID testing reviewed. Patient may RTW tomorrow. Continue to monitor symptoms. Supportive care advised. Return if symptoms worsen or fail to improve. All patient questions answered. Patient voiced understanding. Elliott Hagan is a 31 y.o. female being evaluated by a Virtual Visit (video visit) encounter to address concerns as mentioned above. A caregiver was present when appropriate. Due to this being a TeleHealth encounter (During TXWUX-55 public [facility] emergency), evaluation of the following organ systems was limited: Vitals/Constitutional/EENT/Resp/CV/GI//MS/Neuro/Skin/Heme-Lymph-Imm. Pursuant to the emergency declaration under the [address] [place] [address], [address] [place] [address] authority and the [employer] Act, this Virtual Visit was conducted with patient's (and/or legal guardian's) consent, to reduce the patient's risk of exposure to COVID-19 and provide necessary medical care. The patient (and/or legal guardian) has also been advised to contact this office for worsening conditions or problems, and seek emergency medical treatment and/or call 911 if deemed necessary. Services were provided through a video synchronous discussion virtually to substitute for in-person clinic visit. Patient was located in home and provider located in office. --ALLAN Zacarias on 1/13/2021 at 3:16 PM    An electronic signature was used to authenticate this note.

## 2021-01-13 NOTE — LETTER
[facility] department of [place] [address]  Phone: [phone]  Fax: [phone]    Candy Rich                    To Whom It May Concern:    Huong Fuller may return to school 1/14/2020 . Corby Jolly tested negative for COVID. If you have any questions or concerns please don't hesitate to call.     Sincerely,      ALLAN Rich/Moira

## 2021-01-13 NOTE — PROGRESS NOTES
Subjective:      Patient ID: Elliott Hagan  is a 31 y.o. y.o. female. Luis Landis presents today for an annual exam.  She reports that she has concern she may have PCOS. She reports irregular menses for the past six months. Her cycles are 26-32 days x 4 days and she requires regular tampon changes every 3-4 hours. She has menstrual pain she would rate a 6-7 out of 10 and she uses ibuprofen 800 mg. To control the pain. She also reports pain when she is not menstruating, more on the right side, stopping her in her tracks, sharp and stabbing in nature. She would rate that pain a 9 out of 10 and it lasts for less than 5 seconds. She also reports clots small stringy and one 3 cm. Clot on day two. She is sexually active. She and her partner are trying to conceive since September. She has one child and her spouse has not fathered any other children. She reports steady weight. Review of Systems   Constitutional: Negative. HENT: Negative. Eyes: Negative. Respiratory: Negative. Cardiovascular: Negative. Gastrointestinal: Negative. Genitourinary: Negative. Musculoskeletal: Negative. Skin: Negative. Neurological: Negative. Psychiatric/Behavioral: Negative. Breast ROS: negative    Objective:   /80 (Site: Left Upper Arm, Position: Sitting, Cuff Size: Small Adult)   Pulse 76   Ht 5' 2.5\" (1.588 m)   Wt 184 lb 3.2 oz (83.6 kg)   LMP 12/18/2020 (Exact Date)   SpO2 98%   Breastfeeding No   BMI 33.15 kg/m²   Physical Exam  Constitutional:       Appearance: She is well-developed. HENT:      Head: Normocephalic and atraumatic. Eyes:      Conjunctiva/sclera: Conjunctivae normal.   Neck:      Musculoskeletal: Normal range of motion and neck supple. Cardiovascular:      Rate and Rhythm: Normal rate and regular rhythm. Heart sounds: Normal heart sounds. Pulmonary:      Effort: Pulmonary effort is normal.      Breath sounds: Normal breath sounds.    Chest: Breasts: Breasts are symmetrical.         Right: No inverted nipple, mass, nipple discharge, skin change or tenderness. Left: No inverted nipple, mass, nipple discharge, skin change or tenderness. Abdominal:      General: Bowel sounds are normal.      Palpations: Abdomen is soft. Genitourinary:     General: Normal vulva. Vagina: Normal.      Comments: External genitalia WNL, no lesions noted. Vaginal canal is pink with rugae present and note slight brownish yellow vaginal discharge. Cervix is parous, freely mobile and nontender. Uterus is NSSAVNT, adnexa are small, freely mobile and nontender. No abnormalities noted or pain with exam illicited. Musculoskeletal: Normal range of motion. Skin:     General: Skin is warm and dry. Neurological:      Mental Status: She is alert and oriented to person, place, and time. Deep Tendon Reflexes: Reflexes are normal and symmetric. Sexually active: Yes  Any bleeding or pain withintercourse: No  Last Yearly:  11/2016  Last Pap 2/2016 Negative ESVIN  Abnl. Pap 2014 ASCUS, Neg. HR HPV, repeat pap WNL  Do you do self breast exams: Yes    Assessment:      Diagnosis Orders   1. Women's annual routine gynecological examination     2. Screening for malignant neoplasm of cervix  PAP SMEAR   3. Routine screening for STI (sexually transmitted infection)  C.trachomatis N.gonorrhoeae DNA, Thin Prep   4. Pelvic pain  US PELVIS COMPLETE   5. Obesity (BMI 30-39. 9)  Glucose, Fasting    Insulin, Total    TSH    T4, Free           Plan:      Orders Placed This Encounter   Procedures    C.trachomatis N.gonorrhoeae DNA, Thin Prep     Standing Status:   Future     Standing Expiration Date:   1/13/2022    US PELVIS COMPLETE     Standing Status:   Future     Standing Expiration Date:   1/13/2022    PAP SMEAR     Patient History:    Patient's last menstrual period was 12/18/2020 (exact date).   OBGYN Status: Having periods  Past Surgical History: 05/25/2016: COLONOSCOPY      Comment:  Barbara Cardenas   05/25/2016: UPPER GASTROINTESTINAL ENDOSCOPY      Social History    Tobacco Use      Smoking status: Never Smoker      Smokeless tobacco: Never Used       Standing Status:   Future     Standing Expiration Date:   1/28/2022     Order Specific Question:   Collection Type     Answer: Thin Prep     Order Specific Question:   Prior Abnormal Pap Test     Answer:   Yes     Order Specific Question:   If Prior Abnormal, Give Date     Answer:   11/2014 ASCUS     Order Specific Question:   Prior Treatment     Answer: Other     Comments:   repeat     Order Specific Question:   Screening or Diagnostic     Answer:   Screening     Order Specific Question:   HPV Requested? Answer:   Yes - If Abnormal Reflex HPV     Order Specific Question:   High Risk Patient     Answer:   N/A    Glucose, Fasting     Standing Status:   Future     Standing Expiration Date:   1/13/2022    Insulin, Total     Standing Status:   Future     Standing Expiration Date:   1/13/2022    TSH     Standing Status:   Future     Standing Expiration Date:   1/13/2022    T4, Free     Standing Status:   Future     Standing Expiration Date:   1/13/2022   Education: discussed menstrual cycles and normal variance with her cycle frequency. Ds'd timing of intercourse, remain lying down for 30-60 minutes post coital, PNV daily, and will f/u labs and pelvic US as indicated. RTO 12 months and prn.

## 2021-01-14 ENCOUNTER — HOSPITAL ENCOUNTER (OUTPATIENT)
Dept: ULTRASOUND IMAGING | Age: 31
Discharge: HOME | End: 2021-01-16
Payer: COMMERCIAL

## 2021-01-14 DIAGNOSIS — R10.2 PELVIC PAIN: ICD-10-CM

## 2021-01-14 PROCEDURE — 76856 US EXAM PELVIC COMPLETE: CPT

## 2021-01-15 LAB
CHLAMYDIA BY THIN PREP: NEGATIVE
N. GONORRHOEAE DNA, THIN PREP: NEGATIVE
SPECIMEN DESCRIPTION: NORMAL

## 2021-01-21 DIAGNOSIS — B96.89 BV (BACTERIAL VAGINOSIS): Primary | ICD-10-CM

## 2021-01-21 DIAGNOSIS — N76.0 BV (BACTERIAL VAGINOSIS): Primary | ICD-10-CM

## 2021-01-21 LAB — CYTOLOGY REPORT: NORMAL

## 2021-01-21 RX ORDER — METRONIDAZOLE 500 MG/1
500 TABLET ORAL 2 TIMES DAILY WITH MEALS
Qty: 14 TABLET | Refills: 0 | Status: SHIPPED | OUTPATIENT
Start: 2021-01-21 | End: 2021-01-28

## 2021-03-10 ENCOUNTER — PATIENT MESSAGE (OUTPATIENT)
Dept: FAMILY MEDICINE CLINIC | Age: 31
End: 2021-03-10

## 2021-03-10 DIAGNOSIS — R53.83 FATIGUE, UNSPECIFIED TYPE: Primary | ICD-10-CM

## 2021-03-10 LAB
BASOPHILS ABSOLUTE: 48 /ΜL
BASOPHILS RELATIVE PERCENT: 0.4 %
EOSINOPHILS ABSOLUTE: 119 /ΜL
EOSINOPHILS RELATIVE PERCENT: 1 %
HCT VFR BLD CALC: 41.8 % (ref 36–46)
HEMOGLOBIN: 13 G/DL (ref 12–16)
LYMPHOCYTES ABSOLUTE: 2749 /ΜL
LYMPHOCYTES RELATIVE PERCENT: 23.1 %
MCH RBC QN AUTO: 26.9 PG
MCHC RBC AUTO-ENTMCNC: 31.1 G/DL
MCV RBC AUTO: 86.5 FL
MONOCYTES ABSOLUTE: 631 /ΜL
MONOCYTES RELATIVE PERCENT: 5.3 %
NEUTROPHILS ABSOLUTE: 8354 /ΜL
NEUTROPHILS RELATIVE PERCENT: 70.2 %
PDW BLD-RTO: NORMAL %
PLATELET # BLD: 254 K/ΜL
PMV BLD AUTO: 10.9 FL
RBC # BLD: 4.83 10^6/ΜL
VITAMIN D 25-HYDROXY: 26
VITAMIN D2, 25 HYDROXY: NORMAL
VITAMIN D3,25 HYDROXY: NORMAL
WBC # BLD: 11.9 10^3/ML

## 2021-03-10 NOTE — TELEPHONE ENCOUNTER
From: Soniya Paulson  To:  LAWANDA Roque - CNP  Sent: 3/10/2021 1:51 PM EST  Subject: Non-Urgent Medical Question    Can I get vit d level checked and a cbc faxed over to activate

## 2021-03-11 DIAGNOSIS — E55.9 VITAMIN D DEFICIENCY: Primary | ICD-10-CM

## 2021-03-11 RX ORDER — ERGOCALCIFEROL 1.25 MG/1
50000 CAPSULE ORAL WEEKLY
Qty: 12 CAPSULE | Refills: 1 | Status: SHIPPED | OUTPATIENT
Start: 2021-03-11

## 2021-03-16 DIAGNOSIS — R53.83 FATIGUE, UNSPECIFIED TYPE: ICD-10-CM

## 2021-03-25 ENCOUNTER — TELEPHONE (OUTPATIENT)
Dept: FAMILY MEDICINE CLINIC | Age: 31
End: 2021-03-25

## 2021-03-25 RX ORDER — PREDNISONE 20 MG/1
20 TABLET ORAL 2 TIMES DAILY
Qty: 10 TABLET | Refills: 0 | Status: SHIPPED | OUTPATIENT
Start: 2021-03-25 | End: 2021-03-30

## 2021-05-11 RX ORDER — NEOMYCIN SULFATE, POLYMYXIN B SULFATE AND HYDROCORTISONE 10; 3.5; 1 MG/ML; MG/ML; [USP'U]/ML
3 SUSPENSION/ DROPS AURICULAR (OTIC) 3 TIMES DAILY
Qty: 1 BOTTLE | Refills: 0 | Status: SHIPPED | OUTPATIENT
Start: 2021-05-11 | End: 2021-05-18

## 2021-05-24 ENCOUNTER — OFFICE VISIT (OUTPATIENT)
Dept: FAMILY MEDICINE CLINIC | Age: 31
End: 2021-05-24
Payer: COMMERCIAL

## 2021-05-24 VITALS
OXYGEN SATURATION: 98 % | HEART RATE: 78 BPM | SYSTOLIC BLOOD PRESSURE: 100 MMHG | BODY MASS INDEX: 34.73 KG/M2 | DIASTOLIC BLOOD PRESSURE: 62 MMHG | WEIGHT: 196 LBS | HEIGHT: 63 IN

## 2021-05-24 DIAGNOSIS — L30.9 DERMATITIS: Primary | ICD-10-CM

## 2021-05-24 PROCEDURE — 99213 OFFICE O/P EST LOW 20 MIN: CPT | Performed by: NURSE PRACTITIONER

## 2021-05-24 RX ORDER — TRIAMCINOLONE ACETONIDE 1 MG/G
CREAM TOPICAL
Qty: 60 G | Refills: 1 | Status: SHIPPED | OUTPATIENT
Start: 2021-05-24

## 2021-05-24 SDOH — ECONOMIC STABILITY: FOOD INSECURITY: WITHIN THE PAST 12 MONTHS, YOU WORRIED THAT YOUR FOOD WOULD RUN OUT BEFORE YOU GOT MONEY TO BUY MORE.: NEVER TRUE

## 2021-05-24 ASSESSMENT — ENCOUNTER SYMPTOMS
RHINORRHEA: 0
SHORTNESS OF BREATH: 0
SORE THROAT: 0

## 2021-05-24 ASSESSMENT — SOCIAL DETERMINANTS OF HEALTH (SDOH): HOW HARD IS IT FOR YOU TO PAY FOR THE VERY BASICS LIKE FOOD, HOUSING, MEDICAL CARE, AND HEATING?: NOT HARD AT ALL

## 2021-05-24 NOTE — PROGRESS NOTES
[address] [place] [address] [address] [place] [address], [id number]  [phone]      HPI:     Rash  This is a new problem. The current episode started in the past 7 days. The problem is unchanged. The affected locations include the left fingers. The rash is characterized by itchiness and redness. It is unknown if there was an exposure to a precipitant. Pertinent negatives include no facial edema, fever, rhinorrhea, shortness of breath or sore throat. The treatment provided mild relief. Current Outpatient Medications   Medication Sig Dispense Refill    triamcinolone (KENALOG) 0.1 % cream Apply topically 3 times daily for 7-10 days as needed 60 g 1    vitamin D (ERGOCALCIFEROL) 1.25 MG (50299 UT) CAPS capsule Take 1 capsule by mouth once a week 12 capsule 1    valACYclovir (VALTREX) 500 MG tablet Take 1 tablet by mouth daily 90 tablet 3    valACYclovir (VALTREX) 1 g tablet 2 TABS AT ONSET OF COLD SORE, REPEAT DOSE IN 12 HOURS ONCE (Patient not taking: Reported on 1/13/2021) 4 tablet 3    omeprazole (PRILOSEC) 20 MG delayed release capsule Take 1 capsule by mouth daily 30 capsule 11    clotrimazole-betamethasone (LOTRISONE) 1-0.05 % cream Apply topically 2 times daily. (Patient not taking: Reported on 1/13/2021) 1 Tube 1     No current facility-administered medications for this visit. No Known Allergies    All patients pastmedical, surgical, social and family history has been reviewed. Subjective:      Review of Systems   Constitutional: Negative for activity change, appetite change and fever. HENT: Negative for rhinorrhea and sore throat. Respiratory: Negative for shortness of breath. Skin: Positive for rash. Objective:      Physical Exam  Vitals and nursing note reviewed. Constitutional:       Appearance: Normal appearance. HENT:      Head: Normocephalic and atraumatic. Skin:     Capillary Refill: Capillary refill takes less than 2 seconds.       Findings: Rash present. Rash is papular. Neurological:      Mental Status: She is alert. Assessment:       Diagnosis Orders   1. Dermatitis         Plan:      Kenalog cream as directed   Return if symptoms do not improve or worsen   Return PRN  No follow-ups on file. No orders of the defined types were placed in this encounter. Orders Placed This Encounter   Medications    triamcinolone (KENALOG) 0.1 % cream     Sig: Apply topically 3 times daily for 7-10 days as needed     Dispense:  60 g     Refill:  1       Patient given educational materials - see patient instructions. All patient questionsanswered. Pt voiced understanding. Reviewed health maintenance.      Electronically signed by LAWANDA Lal CNP, CNP on 5/24/2021 at 1:44 PM

## 2021-05-27 ENCOUNTER — HOSPITAL ENCOUNTER (OUTPATIENT)
Dept: LAB | Age: 31
Discharge: HOME | End: 2021-05-27
Payer: COMMERCIAL

## 2021-05-27 DIAGNOSIS — Z32.00 UNCONFIRMED PREGNANCY: ICD-10-CM

## 2021-05-27 DIAGNOSIS — Z32.00 UNCONFIRMED PREGNANCY: Primary | ICD-10-CM

## 2021-05-27 LAB — HCG QUANTITATIVE: <1 IU/L

## 2021-05-27 PROCEDURE — 84702 CHORIONIC GONADOTROPIN TEST: CPT

## 2021-05-27 PROCEDURE — 36415 COLL VENOUS BLD VENIPUNCTURE: CPT

## 2021-07-27 ENCOUNTER — PATIENT MESSAGE (OUTPATIENT)
Dept: FAMILY MEDICINE CLINIC | Age: 31
End: 2021-07-27

## 2021-07-27 DIAGNOSIS — F41.9 ANXIETY: Primary | ICD-10-CM

## 2021-07-27 NOTE — TELEPHONE ENCOUNTER
From: John Morgan  To: LAWANDA Shaffer - CNP  Sent: 7/27/2021 8:40 AM EDT  Subject: Prescription Question    Hi friends!!! I miss your faces!!! So I have been taking my buspirone a little more. But I'm having alot of side effects from it. Can we change it by chance?
Please see imagoot message, patient has side effects from Buspar.   Can wait till Thursday
monitoring device

## 2021-07-29 RX ORDER — HYDROXYZINE PAMOATE 25 MG/1
25 CAPSULE ORAL 3 TIMES DAILY PRN
Qty: 90 CAPSULE | Refills: 1 | Status: SHIPPED | OUTPATIENT
Start: 2021-07-29 | End: 2021-08-12

## 2021-08-09 DIAGNOSIS — B00.1 HERPES LABIALIS: Primary | ICD-10-CM

## 2021-08-09 RX ORDER — VALACYCLOVIR HYDROCHLORIDE 1 G/1
TABLET, FILM COATED ORAL
Qty: 20 TABLET | Refills: 3 | Status: SHIPPED | OUTPATIENT
Start: 2021-08-09 | End: 2022-01-25 | Stop reason: SDUPTHER

## 2021-08-09 NOTE — TELEPHONE ENCOUNTER
Indira called requesting a refill of the below medication which has been pended for you:     Requested Prescriptions     Pending Prescriptions Disp Refills    valACYclovir (VALTREX) 1 g tablet 4 tablet 3     Si TABS AT ONSET OF COLD SORE, REPEAT DOSE IN 12 HOURS ONCE       Last Appointment Date: 2021  Next Appointment Date: Visit date not found    No Known Allergies

## 2021-08-11 DIAGNOSIS — N76.0 BV (BACTERIAL VAGINOSIS): Primary | ICD-10-CM

## 2021-08-11 DIAGNOSIS — B96.89 BV (BACTERIAL VAGINOSIS): Primary | ICD-10-CM

## 2021-08-11 RX ORDER — METRONIDAZOLE 500 MG/1
500 TABLET ORAL 2 TIMES DAILY WITH MEALS
Qty: 14 TABLET | Refills: 0 | Status: SHIPPED | OUTPATIENT
Start: 2021-08-11 | End: 2021-08-18

## 2021-08-27 ENCOUNTER — E-VISIT (OUTPATIENT)
Dept: FAMILY MEDICINE CLINIC | Age: 31
End: 2021-08-27
Payer: COMMERCIAL

## 2021-08-27 PROCEDURE — 99421 OL DIG E/M SVC 5-10 MIN: CPT | Performed by: NURSE PRACTITIONER

## 2021-08-27 RX ORDER — KETOROLAC TROMETHAMINE 10 MG/1
10 TABLET, FILM COATED ORAL EVERY 6 HOURS PRN
Qty: 20 TABLET | Refills: 0 | Status: SHIPPED | OUTPATIENT
Start: 2021-08-27

## 2021-08-27 NOTE — PROGRESS NOTES
5-10 minutes were spent on the digital evaluation and management of this patient.     Pt was given toradol to use PRN

## 2021-10-05 ENCOUNTER — OFFICE VISIT (OUTPATIENT)
Dept: OPTOMETRY | Age: 31
End: 2021-10-05
Payer: COMMERCIAL

## 2021-10-05 DIAGNOSIS — H52.13 MYOPIA OF BOTH EYES WITH ASTIGMATISM: Primary | ICD-10-CM

## 2021-10-05 DIAGNOSIS — H52.203 MYOPIA OF BOTH EYES WITH ASTIGMATISM: Primary | ICD-10-CM

## 2021-10-05 PROCEDURE — 92014 COMPRE OPH EXAM EST PT 1/>: CPT | Performed by: OPTOMETRIST

## 2021-10-05 ASSESSMENT — REFRACTION_WEARINGRX
OS_SPHERE: -4.00
OD_CYLINDER: -1.50
OD_SPHERE: -4.00
OD_AXIS: 180
OS_AXIS: 169
SPECS_TYPE: SVL
OS_CYLINDER: -2.25

## 2021-10-05 ASSESSMENT — ENCOUNTER SYMPTOMS
EYES NEGATIVE: 0
RESPIRATORY NEGATIVE: 0
ALLERGIC/IMMUNOLOGIC NEGATIVE: 0
GASTROINTESTINAL NEGATIVE: 0

## 2021-10-05 ASSESSMENT — VISUAL ACUITY
METHOD: SNELLEN - LINEAR
CORRECTION_TYPE: GLASSES
OS_CC: 20/25

## 2021-10-05 ASSESSMENT — KERATOMETRY
OS_K1POWER_DIOPTERS: 43.50
OD_K2POWER_DIOPTERS: 46.00
OD_AXISANGLE_DEGREES: 092
METHOD_AUTO_MANUAL: AUTOMATED
OS_AXISANGLE_DEGREES: 085
OD_K1POWER_DIOPTERS: 43.75
OD_AXISANGLE2_DEGREES: 002
OS_AXISANGLE2_DEGREES: 175
OS_K2POWER_DIOPTERS: 46.25

## 2021-10-05 ASSESSMENT — REFRACTION_MANIFEST
OS_AXIS: 170
OD_AXIS: 180
OS_SPHERE: -4.25
OD_CYLINDER: -1.75
OS_CYLINDER: -2.50
OD_SPHERE: -4.25

## 2021-10-05 ASSESSMENT — REFRACTION_CURRENTRX
OS_BASECURVE: 8.7
OD_BRAND: BIOFINITY TORIC
OS_CYLINDER: -1.75
OD_SPHERE: -4.00
OS_AXIS: 170
OD_AXIS: 180
OD_CYLINDER: -1.25
OS_BRAND: BIOFINITY TORIC
OS_SPHERE: -4.00
OD_BASECURVE: 8.7

## 2021-10-05 ASSESSMENT — SLIT LAMP EXAM - LIDS
COMMENTS: NORMAL
COMMENTS: NORMAL

## 2021-10-05 ASSESSMENT — TONOMETRY
IOP_METHOD: NON-CONTACT AIR PUFF
OD_IOP_MMHG: 17
OS_IOP_MMHG: 15

## 2021-10-05 NOTE — PROGRESS NOTES
Zain Espinoza presents today for   Chief Complaint   Patient presents with    Blurred Vision    Vision Exam   .    HPI     Blurred Vision     In both eyes. Vision is blurred. Context:  distance vision. Comments     Last Vision Exam: 10/5/2020 Aw  Last Ophthalmology Exam: n/a  Last Filled Glasses Rx: ? Insurance: [employer]  Update: Contacts and glasses if needed  Distance is getting more burry  Copy of glasses rx.   Vision seems to be changing some in the distance and near  Wore contact lenses last night                      ROS     Negative for: Constitutional, Gastrointestinal, Neurological, Skin, Genitourinary, Musculoskeletal, HENT, Endocrine, Cardiovascular, Eyes, Respiratory, Psychiatric, Allergic/Imm, Heme/Lymph          Family History   Problem Relation Age of Onset    Diabetes Father     Diabetes Paternal Grandmother     Cancer Paternal Grandmother     Diabetes Paternal Grandfather     Thyroid Disease Paternal Grandfather     High Blood Pressure Paternal Grandfather     Cataracts Neg Hx     Glaucoma Neg Hx        Social History     Socioeconomic History    Marital status:      Spouse name: None    Number of children: None    Years of education: None    Highest education level: None   Occupational History    None   Tobacco Use    Smoking status: Never Smoker    Smokeless tobacco: Never Used   Vaping Use    Vaping Use: Never used   Substance and Sexual Activity    Alcohol use: Yes     Comment: occasionally    Drug use: Never    Sexual activity: Yes     Partners: Male     Comment: fiance   Other Topics Concern    None   Social History Narrative    None     Social Determinants of Health     Financial Resource Strain: Low Risk     Difficulty of Paying Living Expenses: Not hard at all   Food Insecurity: No Food Insecurity    Worried About Running Out of Food in the Last Year: Never true    Cris of Food in the Last Year: Never true   Transportation Needs:     Lack of Transportation (Medical):      Lack of Transportation (Non-Medical):    Physical Activity:     Days of Exercise per Week:     Minutes of Exercise per Session:    Stress:     Feeling of Stress :    Social Connections:     Frequency of Communication with Friends and Family:     Frequency of Social Gatherings with Friends and Family:     Attends Christianity Services:     Active Member of Clubs or Organizations:     Attends Club or Organization Meetings:     Marital Status:    Intimate Partner Violence:     Fear of Current or Ex-Partner:     Emotionally Abused:     Physically Abused:     Sexually Abused:      Past Medical History:   Diagnosis Date    Abnormal Pap smear of cervix 09/22/2014    HPV    Breast disorder     sore breasts increased caffeine use    Chicken pox 12/1992    Depression     Herpes simplex virus (HSV) infection     Hiatal hernia 2016    Postpartum depression     Rh sensitized     Vaginal delivery 2012       Neuro/Psych     Neuro/Psych     Oriented x3: Yes    Mood/Affect: Normal                Main Ophthalmology Exam     External Exam       Right Left    External Normal Normal          Slit Lamp Exam       Right Left    Lids/Lashes Normal Normal    Conjunctiva/Sclera White and quiet White and quiet    Cornea Clear Clear    Anterior Chamber Deep and quiet Deep and quiet    Iris Round and reactive Round and reactive    Lens Clear Clear    Vitreous Normal Normal          Fundus Exam       Right Left    Disc Normal Normal    C/D Ratio 0.1 0.1    Macula Normal Normal    Vessels Normal Normal                  Tonometry     Tonometry (Non-contact air puff, 9:30 AM)       Right Left    Pressure 17 15   IOPg:  15.9             15.1  CH:  9.7          11.3  WS: 8.7          6.3                     Visual Acuity (Snellen - Linear)       Right Left    Dist cc 20/30 20/25    Correction: Glasses        Keratometry     Keratometry (Automated)       K1 Axis K2 Axis    Right 43.75 002 46.00 092 Left 43.50 175 46.25 085                Ophthalmology Exam     Wearing Rx       Sphere Cylinder Axis    Right -4.00 -1.50 180    Left -4.00 -2.25 169    Age: 3yr    Type: SVL                Manifest Refraction     Manifest Refraction       Sphere Cylinder [place]    Right -4.25 -1.75 180 20/25    Left -4.25 -2.50 170 20/25          Manifest Refraction #2 (Auto)       Sphere Cylinder [place]    Right -4.00 -2.25 001     Left -4.00 -2.75 169                  Final Rx       Sphere Cylinder Axis    Right -4.25 -1.75 180    Left -4.25 -2.50 170    Type: SVL    Expiration Date: 10/6/2023           Contact Lens Current Rx     Current Contact Lens Rx       Brand Base Curve Sphere Cylinder Axis    Right Biofinity Toric 8.7 -4.00 -1.25 180    Left Biofinity Toric 8.7 -4.00 -1.75 170                Final   Final Contact Lens Rx       Brand Base Curve Sphere Cylinder Axis    Right Biofinity Toric 8.7 -4.25 -1.25 180    Left Biofinity Toric 8.7 -4.25 -1.75 170    Expiration Date: 10/6/2022    Replacement: Monthly    Wearing Schedule: Daily wear   Final           IMPRESSION:  1. Myopia of both eyes with astigmatism        PLAN:    1.  New glasses printed;  rx for contact lenses done and contact lenses and trials will be ordered       Patient Instructions   New contact lenses will be ordered      Return in about 1 year (around 10/5/2022) for complete eye exam.

## 2021-10-10 ENCOUNTER — HOSPITAL ENCOUNTER (OUTPATIENT)
Age: 31
Setting detail: SPECIMEN
Discharge: HOME | End: 2021-10-10
Payer: COMMERCIAL

## 2021-10-10 ENCOUNTER — OFFICE VISIT (OUTPATIENT)
Dept: PRIMARY CARE CLINIC | Age: 31
End: 2021-10-10
Payer: COMMERCIAL

## 2021-10-10 VITALS
WEIGHT: 198.2 LBS | SYSTOLIC BLOOD PRESSURE: 118 MMHG | HEART RATE: 84 BPM | OXYGEN SATURATION: 98 % | DIASTOLIC BLOOD PRESSURE: 80 MMHG | RESPIRATION RATE: 20 BRPM | HEIGHT: 63 IN | BODY MASS INDEX: 35.12 KG/M2 | TEMPERATURE: 98.1 F

## 2021-10-10 DIAGNOSIS — J02.9 SORE THROAT: ICD-10-CM

## 2021-10-10 DIAGNOSIS — R05.9 COUGH: ICD-10-CM

## 2021-10-10 DIAGNOSIS — R05.9 COUGH: Primary | ICD-10-CM

## 2021-10-10 DIAGNOSIS — R68.83 CHILLS: ICD-10-CM

## 2021-10-10 LAB — S PYO AG THROAT QL: NORMAL

## 2021-10-10 PROCEDURE — U0003 INFECTIOUS AGENT DETECTION BY NUCLEIC ACID (DNA OR RNA); SEVERE ACUTE RESPIRATORY SYNDROME CORONAVIRUS 2 (SARS-COV-2) (CORONAVIRUS DISEASE [COVID-19]), AMPLIFIED PROBE TECHNIQUE, MAKING USE OF HIGH THROUGHPUT TECHNOLOGIES AS DESCRIBED BY CMS-2020-01-R: HCPCS

## 2021-10-10 PROCEDURE — 99213 OFFICE O/P EST LOW 20 MIN: CPT | Performed by: NURSE PRACTITIONER

## 2021-10-10 PROCEDURE — 87880 STREP A ASSAY W/OPTIC: CPT | Performed by: NURSE PRACTITIONER

## 2021-10-10 PROCEDURE — U0005 INFEC AGEN DETEC AMPLI PROBE: HCPCS

## 2021-10-10 RX ORDER — DEXAMETHASONE 6 MG/1
12 TABLET ORAL ONCE
Qty: 2 TABLET | Refills: 0 | Status: SHIPPED | OUTPATIENT
Start: 2021-10-10 | End: 2021-10-10

## 2021-10-10 RX ORDER — BENZONATATE 200 MG/1
200 CAPSULE ORAL 3 TIMES DAILY PRN
Qty: 30 CAPSULE | Refills: 0 | Status: SHIPPED | OUTPATIENT
Start: 2021-10-10 | End: 2021-10-14 | Stop reason: SDUPTHER

## 2021-10-10 ASSESSMENT — ENCOUNTER SYMPTOMS
RHINORRHEA: 1
SORE THROAT: 1
COUGH: 1
SWOLLEN GLANDS: 0
VOMITING: 1
DIARRHEA: 1
SINUS PAIN: 0
NAUSEA: 1
ABDOMINAL PAIN: 0
WHEEZING: 0

## 2021-10-10 NOTE — LETTER
[address] [facility] A department of [place] [address]  Phone: [phone]  Fax: [phone]    LAWANDA Fermin CNP        October 10, 2021     Patient: Shannan Manning   YOB: 1990   Date of Visit: 10/10/2021       To Whom it May Concern:    Shannan Manning was seen in my clinic on 10/10/2021. She may return to work on 10/13/21. If you have any questions or concerns, please don't hesitate to call.     Sincerely,         LAWANDA Fermin CNP

## 2021-10-10 NOTE — PROGRESS NOTES
Subjective:      Patient ID: Danika Cristina is a 36 y.o. female coming in for   Chief Complaint   Patient presents with    Pharyngitis     cough, body aches, started thursday. the body aches went away. today she has HA , ST, cough, vomiting and diarrhea all started today        URI   This is a new problem. Episode onset: 10/7/21. The problem has been gradually worsening. The maximum temperature recorded prior to her arrival was 100.4 - 100.9 F. The fever has been present for 1 to 2 days. Associated symptoms include congestion, coughing, diarrhea, headaches, joint pain, nausea, rhinorrhea, a sore throat and vomiting. Pertinent negatives include no abdominal pain, chest pain, dysuria, ear pain, joint swelling, neck pain, plugged ear sensation, rash, sinus pain, sneezing, swollen glands or wheezing. She has tried NSAIDs, acetaminophen and decongestant for the symptoms. The treatment provided mild relief. Review of Systems   HENT: Positive for congestion, rhinorrhea and sore throat. Negative for ear pain, sinus pain and sneezing. Respiratory: Positive for cough. Negative for wheezing. Cardiovascular: Negative for chest pain. Gastrointestinal: Positive for diarrhea, nausea and vomiting. Negative for abdominal pain. Genitourinary: Negative for dysuria. Musculoskeletal: Positive for joint pain. Negative for neck pain. Skin: Negative for rash. Neurological: Positive for headaches. Objective:  /80   Pulse 84   Temp 98.1 °F (36.7 °C)   Resp 20   Ht 5' 2.5\" (1.588 m)   Wt 198 lb 3.2 oz (89.9 kg)   SpO2 98%   BMI 35.67 kg/m²      Physical Exam  Vitals and nursing note reviewed. Constitutional:       General: She is not in acute distress. Appearance: Normal appearance. She is obese. She is not ill-appearing. HENT:      Head: Normocephalic. Nose: Congestion and rhinorrhea present.       Mouth/Throat:      Mouth: Mucous membranes are moist.      Pharynx: Oropharynx is clear. Posterior oropharyngeal erythema present. No oropharyngeal exudate. Cardiovascular:      Rate and Rhythm: Normal rate and regular rhythm. Heart sounds: Normal heart sounds. Pulmonary:      Effort: Pulmonary effort is normal.      Breath sounds: Normal breath sounds. Abdominal:      General: Bowel sounds are normal.      Palpations: Abdomen is soft. Tenderness: There is no abdominal tenderness. Musculoskeletal:      Cervical back: Neck supple. Right lower leg: No edema. Left lower leg: No edema. Lymphadenopathy:      Cervical: No cervical adenopathy. Skin:     General: Skin is warm and dry. Findings: No rash. Neurological:      General: No focal deficit present. Mental Status: She is alert and oriented to person, place, and time. Assessment:      1. Cough    2. Chills    3. Sore throat           Plan:    Meds as directed. Call PCP if symptoms worsen/persist   Push fluids. Continue with otc meds. Orders Placed This Encounter   Procedures    COVID-19     Standing Status:   Future     Standing Expiration Date:   10/10/2022     Scheduling Instructions:      1) Due to current limited availability of the COVID-19 test, tests will be prioritized based on responses to questions above. Testing may be delayed due to volume. 2) Print and instruct patient to adhere to CDC home isolation program. (Link Above)              3) Set up or refer patient for a monitoring program.              4) Have patient sign up for and leverage [employer] (if not previously done). Order Specific Question:   Is this test for diagnosis or screening? Answer:   Diagnosis of ill patient     Order Specific Question:   Symptomatic for COVID-19 as defined by CDC? Answer:   Yes     Order Specific Question:   Date of Symptom Onset     Answer:   10/7/2021     Order Specific Question:   Hospitalized for COVID-19?      Answer:   No     Order Specific Question:   Admitted to ICU for COVID-19? Answer:   No     Order Specific Question:   Employed in healthcare setting? Answer:   Yes     Order Specific Question:   Resident in a congregate (group) care setting? Answer:   No     Order Specific Question:   Pregnant? Answer:   No     Order Specific Question:   Previously tested for COVID-19? Answer:   No    POCT rapid strep A      Outpatient Encounter Medications as of 10/10/2021   Medication Sig Dispense Refill    ketorolac (TORADOL) 10 MG tablet Take 1 tablet by mouth every 6 hours as needed for Pain 20 tablet 0    valACYclovir (VALTREX) 1 g tablet 2 TABS AT ONSET OF COLD SORE, REPEAT DOSE IN 12 HOURS ONCE 20 tablet 3    triamcinolone (KENALOG) 0.1 % cream Apply topically 3 times daily for 7-10 days as needed 60 g 1    vitamin D (ERGOCALCIFEROL) 1.25 MG (96770 UT) CAPS capsule Take 1 capsule by mouth once a week 12 capsule 1    valACYclovir (VALTREX) 500 MG tablet Take 1 tablet by mouth daily 90 tablet 3    clotrimazole-betamethasone (LOTRISONE) 1-0.05 % cream Apply topically 2 times daily. (Patient taking differently: Apply topically 2 times daily. PRN) 1 Tube 1     No facility-administered encounter medications on file as of 10/10/2021.             Keven Goodwin, APRN - CNP

## 2021-10-12 LAB
SARS-COV-2: ABNORMAL
SARS-COV-2: DETECTED
SOURCE: ABNORMAL

## 2021-10-14 ENCOUNTER — TELEPHONE (OUTPATIENT)
Dept: FAMILY MEDICINE CLINIC | Age: 31
End: 2021-10-14

## 2021-10-14 DIAGNOSIS — R05.9 COUGH: ICD-10-CM

## 2021-10-14 RX ORDER — BENZONATATE 200 MG/1
200 CAPSULE ORAL 3 TIMES DAILY PRN
Qty: 30 CAPSULE | Refills: 0 | Status: SHIPPED | OUTPATIENT
Start: 2021-10-14 | End: 2021-10-21

## 2022-01-11 ENCOUNTER — TELEMEDICINE (OUTPATIENT)
Dept: FAMILY MEDICINE CLINIC | Age: 32
End: 2022-01-11
Payer: COMMERCIAL

## 2022-01-11 DIAGNOSIS — R51.9 NONINTRACTABLE EPISODIC HEADACHE, UNSPECIFIED HEADACHE TYPE: Primary | ICD-10-CM

## 2022-01-11 PROCEDURE — 99213 OFFICE O/P EST LOW 20 MIN: CPT | Performed by: NURSE PRACTITIONER

## 2022-01-11 RX ORDER — PROPRANOLOL HCL 60 MG
60 CAPSULE, EXTENDED RELEASE 24HR ORAL DAILY
Qty: 30 CAPSULE | Refills: 3 | Status: SHIPPED | OUTPATIENT
Start: 2022-01-11 | End: 2022-01-19 | Stop reason: SDUPTHER

## 2022-01-11 RX ORDER — SUMATRIPTAN 50 MG/1
50 TABLET, FILM COATED ORAL
Qty: 9 TABLET | Refills: 1 | Status: SHIPPED | OUTPATIENT
Start: 2022-01-11 | End: 2022-01-11

## 2022-01-11 ASSESSMENT — ENCOUNTER SYMPTOMS
WHEEZING: 0
RHINORRHEA: 0
VISUAL CHANGE: 0
NAUSEA: 1
COUGH: 0
SHORTNESS OF BREATH: 0
BLURRED VISION: 0
VOMITING: 0
SINUS PRESSURE: 0

## 2022-01-11 NOTE — PROGRESS NOTES
2022    TELEHEALTH EVALUATION -- Audio/Visual (During CYVZA-72 public health emergency)    HPI:    Sathya Skelton (:  1990) has requested an audio/video evaluation for the following concern(s):    Headache   This is a chronic problem. The current episode started more than 1 year ago. The problem occurs intermittently. The problem has been gradually worsening. The pain is located in the frontal region. The pain does not radiate. The pain quality is similar to prior headaches. The quality of the pain is described as aching. The pain is at a severity of 8/10. The pain is severe. Associated symptoms include nausea. Pertinent negatives include no blurred vision, coughing, dizziness, fever, loss of balance, muscle aches, phonophobia, rhinorrhea, sinus pressure, visual change or vomiting. The symptoms are aggravated by bright light. She has tried NSAIDs for the symptoms. The treatment provided moderate relief. Her past medical history is significant for migraine headaches.        Past Medical History:   Diagnosis Date    Abnormal Pap smear of cervix 2014    HPV    Breast disorder     sore breasts increased caffeine use    Chicken pox 1992    Depression     Herpes simplex virus (HSV) infection     Hiatal hernia 2016    Postpartum depression     Rh sensitized     Vaginal delivery        Past Surgical History:   Procedure Laterality Date    COLONOSCOPY  2016    [address] [facility] ENDOSCOPY  2016       Social History     Socioeconomic History    Marital status:      Spouse name: None    Number of children: None    Years of education: None    Highest education level: None   Occupational History    None   Tobacco Use    Smoking status: Never Smoker    Smokeless tobacco: Never Used   Vaping Use    Vaping Use: Never used   Substance and Sexual Activity    Alcohol use: Yes     Comment: occasionally    Drug use: Never    Sexual activity: Yes     Partners: Male     Comment: fiance   Other Topics Concern    None   Social History Narrative    None     Social Determinants of Health     Financial Resource Strain: Low Risk     Difficulty of Paying Living Expenses: Not hard at all   Food Insecurity: No Food Insecurity    Worried About Running Out of Food in the Last Year: Never true    [address] in the Last Year: Never true   Transportation Needs:     Lack of Transportation (Medical): Not on file    Lack of Transportation (Non-Medical):  Not on file   Physical Activity:     Days of Exercise per Week: Not on file    Minutes of Exercise per Session: Not on file   Stress:     Feeling of Stress : Not on file   Social Connections:     Frequency of Communication with Friends and Family: Not on file    Frequency of Social Gatherings with Friends and Family: Not on file    Attends Hoahaoism Services: Not on file    Active Member of [address] [place] [address] [employer] or Organizations: Not on file    Attends Club or Organization Meetings: Not on file    Marital Status: Not on file   Intimate Partner Violence:     Fear of Current or Ex-Partner: Not on file    Emotionally Abused: Not on file    Physically Abused: Not on file    Sexually Abused: Not on file   Housing Stability:     Unable to Pay for Housing in the Last Year: Not on file    Number of [place] in the Last Year: Not on file    Unstable Housing in the Last Year: Not on file       Family History   Problem Relation Age of Onset    Diabetes Father     Diabetes Paternal Grandmother     Cancer Paternal Grandmother     Diabetes Paternal Grandfather     Thyroid Disease Paternal Grandfather     High Blood Pressure Paternal Grandfather     Cataracts Neg Hx     Glaucoma Neg Hx        No Known Allergies    Current Outpatient Medications   Medication Sig Dispense Refill    propranolol (INDERAL LA) 60 MG extended release capsule Take 1 capsule by mouth daily 30 capsule 3    SUMAtriptan (IMITREX) 50 MG tablet Take 1 tablet by mouth once as needed for Migraine 9 tablet 1    ketorolac (TORADOL) 10 MG tablet Take 1 tablet by mouth every 6 hours as needed for Pain 20 tablet 0    valACYclovir (VALTREX) 1 g tablet 2 TABS AT ONSET OF COLD SORE, REPEAT DOSE IN 12 HOURS ONCE 20 tablet 3    triamcinolone (KENALOG) 0.1 % cream Apply topically 3 times daily for 7-10 days as needed 60 g 1    vitamin D (ERGOCALCIFEROL) 1.25 MG (56472 UT) CAPS capsule Take 1 capsule by mouth once a week 12 capsule 1    valACYclovir (VALTREX) 500 MG tablet Take 1 tablet by mouth daily 90 tablet 3    clotrimazole-betamethasone (LOTRISONE) 1-0.05 % cream Apply topically 2 times daily. (Patient taking differently: Apply topically 2 times daily. PRN) 1 Tube 1     No current facility-administered medications for this visit. Review of Systems   Constitutional: Positive for activity change. Negative for appetite change, fatigue and fever. HENT: Negative for rhinorrhea and sinus pressure. Eyes: Negative for blurred vision. Respiratory: Negative for cough, shortness of breath and wheezing. Cardiovascular: Negative for chest pain and palpitations. Gastrointestinal: Positive for nausea. Negative for vomiting. Neurological: Positive for headaches. Negative for dizziness, light-headedness and loss of balance. Prior to Visit Medications    Medication Sig Taking?  Authorizing Provider   propranolol (INDERAL LA) 60 MG extended release capsule Take 1 capsule by mouth daily Yes LAWANDA Herrera CNP   SUMAtriptan (IMITREX) 50 MG tablet Take 1 tablet by mouth once as needed for Migraine Yes LAWANDA Herrera CNP   ketorolac (TORADOL) 10 MG tablet Take 1 tablet by mouth every 6 hours as needed for Pain  LAWANDA Herrera CNP   valACYclovir (VALTREX) 1 g tablet 2 TABS AT ONSET OF COLD SORE, REPEAT DOSE IN 12 HOURS ONCE  LAWANDA Sylvester CNP   triamcinolone (KENALOG) 0.1 % cream Apply topically 3 times daily for 7-10 days as needed  Reda Friday, APRN - CNP   vitamin D (ERGOCALCIFEROL) 1.25 MG (56275 UT) CAPS capsule Take 1 capsule by mouth once a week  Reda Friday, APRN - CNP   valACYclovir (VALTREX) 500 MG tablet Take 1 tablet by mouth daily  Reda Friday, APRN - CNP   clotrimazole-betamethasone (LOTRISONE) 1-0.05 % cream Apply topically 2 times daily. Patient taking differently: Apply topically 2 times daily. PRN  Reda Friday, APRN - CNP       Social History     Tobacco Use    Smoking status: Never Smoker    Smokeless tobacco: Never Used   Vaping Use    Vaping Use: Never used   Substance Use Topics    Alcohol use: Yes     Comment: occasionally    Drug use: Never            PHYSICAL EXAMINATION:  [ INSTRUCTIONS:  \"[x]\" Indicates a positive item  \"[]\" Indicates a negative item  -- DELETE ALL ITEMS NOT EXAMINED]  Vital Signs: (As obtained by patient/caregiver or practitioner observation)    Heart rate- 68       Constitutional: [x] Appears well-developed and well-nourished [x] No apparent distress      [] Abnormal-   Mental status  [x] Alert and awake  [x] Oriented to person/place/time [x]Able to follow commands      Eyes:  EOM    [x]  Normal  [] Abnormal-  Sclera  [x]  Normal  [] Abnormal -         Discharge [x]  None visible  [] Abnormal -    HENT:   [x] Normocephalic, atraumatic.   [] Abnormal   [x] Mouth/Throat: Mucous membranes are moist.     External Ears [x] Normal  [] Abnormal-     Neck: [x] No visualized mass     Pulmonary/Chest: [x] Respiratory effort normal.  [x] No visualized signs of difficulty breathing or respiratory distress        [] Abnormal-      Musculoskeletal:   [x] Normal gait with no signs of ataxia         [x] Normal range of motion of neck        [] Abnormal-       Neurological:        [x] No Facial Asymmetry (Cranial nerve 7 motor function) (limited exam to video visit)          [x] No gaze palsy [] Abnormal-         Skin:        [x] No significant exanthematous lesions or discoloration noted on facial skin         [] Abnormal-            Psychiatric:       [x] Normal Affect [x] No Hallucinations        [] Abnormal-         ASSESSMENT/PLAN:  1. Nonintractable episodic headache, unspecified headache type  Headaches are becoming more frequent  Pt is requesting a referral to neurology  Will start propranolol 60mg nightly with imitrex 50mg PRN for acute headaches  Pt to return as scheduled sooner if needed  - External Referral To Neurology      Return in about 4 weeks (around 2/8/2022), or if symptoms worsen or fail to improve. [facility], was evaluated through a synchronous (real-time) audio-video encounter. The patient (or guardian if applicable) is aware that this is a billable service. Verbal consent to proceed has been obtained within the past 12 months. The visit was conducted pursuant to the emergency declaration under the [address] [place] [address] authority and the [employer] Act. Patient identification was verified, and a caregiver was present when appropriate. The patient was located in a state where the provider was credentialed to provide care. Total time spent on this encounter: Not billed by time    --LAWANDA Brooks CNP on 1/11/2022 at 7:54 PM    An electronic signature was used to authenticate this note.

## 2022-01-17 ENCOUNTER — PATIENT MESSAGE (OUTPATIENT)
Dept: FAMILY MEDICINE CLINIC | Age: 32
End: 2022-01-17

## 2022-01-17 NOTE — TELEPHONE ENCOUNTER
From: Alysa Redd  To: Charlette Yoder  Sent: 1/17/2022 10:30 AM EST  Subject: Headaches    Hey guys I just want it to be documented.    Yesterday I had a migraine all day, and I have a headache again today

## 2022-01-19 NOTE — TELEPHONE ENCOUNTER
Indira called requesting a refill of the below medication which has been pended for you:     Requested Prescriptions     Pending Prescriptions Disp Refills    propranolol (INDERAL LA) 60 MG extended release capsule 30 capsule 1     Sig: Take 1 capsule by mouth daily       Last Appointment Date: 1/11/2022  Next Appointment Date: Visit date not found    No Known Allergies

## 2022-01-20 RX ORDER — PROPRANOLOL HCL 60 MG
60 CAPSULE, EXTENDED RELEASE 24HR ORAL DAILY
Qty: 30 CAPSULE | Refills: 1 | Status: SHIPPED | OUTPATIENT
Start: 2022-01-20 | End: 2022-10-31 | Stop reason: ALTCHOICE

## 2022-01-25 DIAGNOSIS — B00.1 HERPES LABIALIS: ICD-10-CM

## 2022-01-25 RX ORDER — VALACYCLOVIR HYDROCHLORIDE 1 G/1
TABLET, FILM COATED ORAL
Qty: 20 TABLET | Refills: 3 | Status: SHIPPED | OUTPATIENT
Start: 2022-01-25

## 2022-01-25 NOTE — TELEPHONE ENCOUNTER
Indira called requesting a refill of the below medication which has been pended for you:     Requested Prescriptions     Pending Prescriptions Disp Refills    valACYclovir (VALTREX) 1 g tablet 20 tablet 3     Si TABS AT ONSET OF COLD SORE, REPEAT DOSE IN 12 HOURS ONCE       Last Appointment Date: 2022  Next Appointment Date: Visit date not found    No Known Allergies

## 2022-03-29 ENCOUNTER — PATIENT MESSAGE (OUTPATIENT)
Dept: FAMILY MEDICINE CLINIC | Age: 32
End: 2022-03-29

## 2022-03-29 DIAGNOSIS — R60.9 SWELLING: Primary | ICD-10-CM

## 2022-04-08 ENCOUNTER — HOSPITAL ENCOUNTER (OUTPATIENT)
Age: 32
Setting detail: SPECIMEN
Discharge: HOME | End: 2022-04-08
Payer: COMMERCIAL

## 2022-04-08 ENCOUNTER — OFFICE VISIT (OUTPATIENT)
Dept: PRIMARY CARE CLINIC | Age: 32
End: 2022-04-08
Payer: COMMERCIAL

## 2022-04-08 VITALS
HEART RATE: 78 BPM | DIASTOLIC BLOOD PRESSURE: 78 MMHG | WEIGHT: 210 LBS | BODY MASS INDEX: 37.21 KG/M2 | TEMPERATURE: 98.2 F | OXYGEN SATURATION: 98 % | SYSTOLIC BLOOD PRESSURE: 110 MMHG | HEIGHT: 63 IN

## 2022-04-08 DIAGNOSIS — R60.0 EDEMA OF BOTH LOWER LEGS: ICD-10-CM

## 2022-04-08 DIAGNOSIS — R60.0 EDEMA OF BOTH LOWER LEGS: Primary | ICD-10-CM

## 2022-04-08 LAB
ABSOLUTE EOS #: 0.17 K/UL (ref 0–0.44)
ABSOLUTE IMMATURE GRANULOCYTE: 0.03 K/UL (ref 0–0.3)
ABSOLUTE LYMPH #: 3.09 K/UL (ref 1.1–3.7)
ABSOLUTE MONO #: 0.73 K/UL (ref 0.1–1.2)
ALBUMIN SERPL-MCNC: 4.1 G/DL (ref 3.5–5.2)
ALBUMIN/GLOBULIN RATIO: 1.3 (ref 1–2.5)
ALP BLD-CCNC: 116 U/L (ref 35–104)
ALT SERPL-CCNC: 28 U/L (ref 5–33)
ANION GAP SERPL CALCULATED.3IONS-SCNC: 6 MMOL/L (ref 9–17)
AST SERPL-CCNC: 17 U/L
BASOPHILS # BLD: 1 % (ref 0–2)
BASOPHILS ABSOLUTE: 0.06 K/UL (ref 0–0.2)
BILIRUB SERPL-MCNC: <0.1 MG/DL (ref 0.3–1.2)
BUN BLDV-MCNC: 13 MG/DL (ref 6–20)
BUN/CREAT BLD: 18 (ref 9–20)
CALCIUM SERPL-MCNC: 9.1 MG/DL (ref 8.6–10.4)
CHLORIDE BLD-SCNC: 105 MMOL/L (ref 98–107)
CO2: 27 MMOL/L (ref 20–31)
CREAT SERPL-MCNC: 0.74 MG/DL (ref 0.5–0.9)
EOSINOPHILS RELATIVE PERCENT: 2 % (ref 1–4)
GFR AFRICAN AMERICAN: >60 ML/MIN
GFR NON-AFRICAN AMERICAN: >60 ML/MIN
GFR SERPL CREATININE-BSD FRML MDRD: ABNORMAL ML/MIN/{1.73_M2}
GLUCOSE BLD-MCNC: 102 MG/DL (ref 70–99)
HCT VFR BLD CALC: 41.2 % (ref 36.3–47.1)
HEMOGLOBIN: 13.6 G/DL (ref 11.9–15.1)
IMMATURE GRANULOCYTES: 0 %
LYMPHOCYTES # BLD: 30 % (ref 24–43)
MCH RBC QN AUTO: 28.2 PG (ref 25.2–33.5)
MCHC RBC AUTO-ENTMCNC: 33 G/DL (ref 25.2–33.5)
MCV RBC AUTO: 85.3 FL (ref 82.6–102.9)
MONOCYTES # BLD: 7 % (ref 3–12)
NRBC AUTOMATED: 0 PER 100 WBC
PDW BLD-RTO: 13.1 % (ref 11.8–14.4)
PLATELET # BLD: 291 K/UL (ref 138–453)
PMV BLD AUTO: 10.1 FL (ref 8.1–13.5)
POTASSIUM SERPL-SCNC: 3.9 MMOL/L (ref 3.7–5.3)
RBC # BLD: 4.83 M/UL (ref 3.95–5.11)
SEG NEUTROPHILS: 60 % (ref 36–65)
SEGMENTED NEUTROPHILS ABSOLUTE COUNT: 6.37 K/UL (ref 1.5–8.1)
SODIUM BLD-SCNC: 138 MMOL/L (ref 135–144)
TOTAL PROTEIN: 7.3 G/DL (ref 6.4–8.3)
WBC # BLD: 10.5 K/UL (ref 3.5–11.3)

## 2022-04-08 PROCEDURE — 85025 COMPLETE CBC W/AUTO DIFF WBC: CPT

## 2022-04-08 PROCEDURE — 80053 COMPREHEN METABOLIC PANEL: CPT

## 2022-04-08 PROCEDURE — 36415 COLL VENOUS BLD VENIPUNCTURE: CPT

## 2022-04-08 PROCEDURE — 99213 OFFICE O/P EST LOW 20 MIN: CPT | Performed by: NURSE PRACTITIONER

## 2022-04-08 RX ORDER — FUROSEMIDE 20 MG/1
20 TABLET ORAL DAILY
Qty: 3 TABLET | Refills: 0 | Status: SHIPPED | OUTPATIENT
Start: 2022-04-08 | End: 2022-10-31 | Stop reason: SDUPTHER

## 2022-04-08 RX ORDER — PROPRANOLOL HYDROCHLORIDE 80 MG/1
CAPSULE, EXTENDED RELEASE ORAL
COMMUNITY
Start: 2022-04-01 | End: 2022-10-31 | Stop reason: ALTCHOICE

## 2022-04-08 ASSESSMENT — PATIENT HEALTH QUESTIONNAIRE - PHQ9
SUM OF ALL RESPONSES TO PHQ QUESTIONS 1-9: 0
SUM OF ALL RESPONSES TO PHQ QUESTIONS 1-9: 0
2. FEELING DOWN, DEPRESSED OR HOPELESS: 0
SUM OF ALL RESPONSES TO PHQ9 QUESTIONS 1 & 2: 0
SUM OF ALL RESPONSES TO PHQ QUESTIONS 1-9: 0
1. LITTLE INTEREST OR PLEASURE IN DOING THINGS: 0
SUM OF ALL RESPONSES TO PHQ QUESTIONS 1-9: 0

## 2022-04-08 ASSESSMENT — ENCOUNTER SYMPTOMS
COUGH: 0
NAUSEA: 0
ABDOMINAL PAIN: 0
RESPIRATORY NEGATIVE: 1
VOMITING: 0
SORE THROAT: 0

## 2022-04-08 NOTE — PROGRESS NOTES
[facility]             [address], [address]                        Telephone [phone]             Fax [phone]     Melissa He  1990  QEA:[id number]   Date of visit:  4/8/2022    Subjective:    Melissa He is a 36 y.o.  female who presents to [facility] today (4/8/2022) for evaluation of:    Chief Complaint   Patient presents with    Other     swelling in bilateral legs, sx began 2 months ago        Other  This is a new problem. The current episode started more than 1 month ago (X 4-6 months and worse the last month). The problem occurs intermittently. The problem has been gradually worsening. Pertinent negatives include no abdominal pain, chest pain, chills, congestion, coughing, fatigue, fever, headaches, myalgias, nausea, rash, sore throat or vomiting. Associated symptoms comments: Swelling bilateral lower legs and ankles that decreases throughout the night; tightness bilateral lower legs with the swelling; denies shortness of breath. Nothing aggravates the symptoms. Treatments tried: compression hose, OTC diurex. The treatment provided no relief. She has the following problem list:  Patient Active Problem List   Diagnosis    Obesity (BMI 30-39. 9)    LSIL (low grade squamous intraepithelial lesion) on Pap smear    Herpes labialis    Myopia of both eyes with astigmatism        Current medications are:  Current Outpatient Medications   Medication Sig Dispense Refill    propranolol (INDERAL LA) 80 MG extended release capsule       Pyridoxine HCl (VITAMIN B6 PO) Take by mouth      furosemide (LASIX) 20 MG tablet Take 1 tablet by mouth daily for 3 days 3 tablet 0    valACYclovir (VALTREX) 1 g tablet 2 TABS AT ONSET OF COLD SORE, REPEAT DOSE IN 12 HOURS ONCE 20 tablet 3    vitamin D (ERGOCALCIFEROL) 1.25 MG (37540 UT) CAPS capsule Take 1 capsule by mouth once a week 12 capsule 1    valACYclovir (VALTREX) 500 MG tablet Take 1 tablet by mouth daily 90 tablet 3    propranolol (INDERAL LA) 60 MG extended release capsule Take 1 capsule by mouth daily (Patient not taking: Reported on 4/8/2022) 30 capsule 1    SUMAtriptan (IMITREX) 50 MG tablet Take 1 tablet by mouth once as needed for Migraine 9 tablet 1    ketorolac (TORADOL) 10 MG tablet Take 1 tablet by mouth every 6 hours as needed for Pain (Patient not taking: Reported on 4/8/2022) 20 tablet 0    triamcinolone (KENALOG) 0.1 % cream Apply topically 3 times daily for 7-10 days as needed (Patient not taking: Reported on 4/8/2022) 60 g 1    clotrimazole-betamethasone (LOTRISONE) 1-0.05 % cream Apply topically 2 times daily. (Patient not taking: Reported on 4/8/2022) 1 Tube 1     No current facility-administered medications for this visit. She has No Known Allergies. .    She  reports that she has never smoked. She has never used smokeless tobacco.      Objective:    Vitals:    04/08/22 1807   BP: 110/78   Site: Left Upper Arm   Position: Sitting   Cuff Size: Medium Adult   Pulse: 78   Temp: 98.2 °F (36.8 °C)   TempSrc: Tympanic   SpO2: 98%   Weight: 210 lb (95.3 kg)   Height: 5' 2.5\" (1.588 m)     Body mass index is 37.8 kg/m². Review of Systems   Constitutional: Negative. Negative for chills, fatigue and fever. HENT: Negative for congestion and sore throat. Respiratory: Negative. Negative for cough. Cardiovascular: Negative. Negative for chest pain. Gastrointestinal: Negative for abdominal pain, nausea and vomiting. Musculoskeletal: Negative for myalgias. Swelling bilateral lower legs and ankles   Skin: Negative for rash. Neurological: Negative for headaches. Physical Exam  Vitals and nursing note reviewed. Constitutional:       Appearance: She is well-developed. HENT:      Head: Normocephalic. Eyes:      Pupils: Pupils are equal, round, and reactive to light.    Cardiovascular: Rate and Rhythm: Normal rate and regular rhythm. Heart sounds: Normal heart sounds. Pulmonary:      Effort: Pulmonary effort is normal.      Breath sounds: Normal breath sounds and air entry. Musculoskeletal:      Cervical back: Normal range of motion and neck supple. Right lower le+ Pitting Edema present. Left lower le+ Pitting Edema present. Skin:     General: Skin is warm and dry. Neurological:      General: No focal deficit present. Mental Status: She is alert and oriented to person, place, and time. Psychiatric:         Behavior: Behavior normal.         Thought Content:  Thought content normal.       Assessment and Plan:    Hospital Outpatient Visit on 2022   Component Date Value Ref Range Status    WBC 2022 10.5  3.5 - 11.3 k/uL Final    RBC 2022 4.83  3.95 - 5.11 m/uL Final    Hemoglobin 2022 13.6  11.9 - 15.1 g/dL Final    Hematocrit 2022 41.2  36.3 - 47.1 % Final    MCV 2022 85.3  82.6 - 102.9 fL Final    MCH 2022 28.2  25.2 - 33.5 pg Final    MCHC 2022 33.0  25.2 - 33.5 g/dL Final    RDW 2022 13.1  11.8 - 14.4 % Final    Platelets  291  138 - 453 k/uL Final    MPV 2022 10.1  8.1 - 13.5 fL Final    NRBC Automated 2022 0.0  0.0 per 100 WBC Final    Seg Neutrophils 2022 60  36 - 65 % Final    Lymphocytes 2022 30  24 - 43 % Final    Monocytes 2022 7  3 - 12 % Final    Eosinophils % 2022 2  1 - 4 % Final    Basophils 2022 1  0 - 2 % Final    Immature Granulocytes 2022 0  0 % Final    Segs Absolute 2022 6.37  1.50 - 8.10 k/uL Final    Absolute Lymph # 2022 3.09  1.10 - 3.70 k/uL Final    Absolute Mono # 2022 0.73  0.10 - 1.20 k/uL Final    Absolute Eos # 2022 0.17  0.00 - 0.44 k/uL Final    Basophils Absolute 2022 0.06  0.00 - 0.20 k/uL Final    Absolute Immature Granulocyte 2022 0.03  0.00 - 0.30 k/uL Final    Glucose 04/08/2022 102* 70 - 99 mg/dL Final    BUN 04/08/2022 13  6 - 20 mg/dL Final    CREATININE 04/08/2022 0.74  0.50 - 0.90 mg/dL Final    Bun/Cre Ratio 04/08/2022 18  9 - 20 Final    Calcium 04/08/2022 9.1  8.6 - 10.4 mg/dL Final    Sodium 04/08/2022 138  135 - 144 mmol/L Final    Potassium 04/08/2022 3.9  3.7 - 5.3 mmol/L Final    Chloride 04/08/2022 105  98 - 107 mmol/L Final    CO2 04/08/2022 27  20 - 31 mmol/L Final    Anion Gap 04/08/2022 6* 9 - 17 mmol/L Final    Alkaline Phosphatase 04/08/2022 116* 35 - 104 U/L Final    ALT 04/08/2022 28  5 - 33 U/L Final    AST 04/08/2022 17  <32 U/L Final    Total Bilirubin 04/08/2022 <0.10* 0.3 - 1.2 mg/dL Final    Total Protein 04/08/2022 7.3  6.4 - 8.3 g/dL Final    Albumin 04/08/2022 4.1  3.5 - 5.2 g/dL Final    Albumin/Globulin Ratio 04/08/2022 1.3  1.0 - 2.5 Final    GFR Non- 04/08/2022 >60  >60 mL/min Final    GFR  04/08/2022 >60  >60 mL/min Final    GFR Comment 04/08/2022        Final    Comment: Average GFR for 20-28 years old:   116 mL/min/1.73sq m  Chronic Kidney Disease:   <60 mL/min/1.73sq m  Kidney failure:   <15 mL/min/1.73sq m              eGFR calculated using average adult body mass. Additional eGFR calculator available at:        [employer]                  Diagnosis Orders   1. Edema of both lower legs  Comprehensive Metabolic Panel    CBC with Auto Differential    furosemide (LASIX) 20 MG tablet     I recommended to decreased sodium intake in diet. We discussed hidden sodium in processed foods. Wear compression socks while awake. Take lasix as directed. Follow up with PCP in 1 week. The use, risks, benefits, and side effects of prescribed or recommended medications were discussed. All questions were answered and the patient/caregiver voiced understanding. No orders of the defined types were placed in this encounter.         Electronically signed by LAWANDA Puentes CNP on 4/8/22 at 6:53 PM EDT

## 2022-04-08 NOTE — PATIENT INSTRUCTIONS
Patient Education        Leg and Ankle Edema: Care Instructions  Your Care Instructions  Swelling in the legs, ankles, and feet is called edema. It is common after you sit or stand for a while. Long plane flights or car rides often cause swelling in the legs and feet. You may also have swelling if you have to stand for long periods of time at your job. Problems with the veins in the legs (varicose veins) and changes in hormones can also cause swelling. Sometimes the swelling in the ankles and feet is caused by a more serious problem, such as heartfailure, infection, blood clots, or liver or kidney disease. Follow-up care is a key part of your treatment and safety. Be sure to make and go to all appointments, and call your doctor if you are having problems. It's also a good idea to know your test results and keep alist of the medicines you take. How can you care for yourself at home?  If your doctor gave you medicine, take it as prescribed. Call your doctor if you think you are having a problem with your medicine.  Whenever you are resting, raise your legs up. Try to keep the swollen area higher than the level of your heart.  Take breaks from standing or sitting in one position. ? Walk around to increase the blood flow in your lower legs. ? Move your feet and ankles often while you stand, or tighten and relax your leg muscles.  Wear support stockings. Put them on in the morning, before swelling gets worse.  Eat a balanced diet. Lose weight if you need to.  Limit the amount of salt (sodium) in your diet. Salt holds fluid in the body and may increase swelling. When should you call for help? Call 911 anytime you think you may need emergency care. For example, call if:     You have symptoms of a blood clot in your lung (called a pulmonary embolism). These may include:  ? Sudden chest pain. ? Trouble breathing. ? Coughing up blood.    Call your doctor now or seek immediate medical care if:     You have signs of a blood clot, such as:  ? Pain in your calf, back of the knee, thigh, or groin. ? Redness and swelling in your leg or groin.      You have symptoms of infection, such as:  ? Increased pain, swelling, warmth, or redness. ? Red streaks or pus. ? A fever. Watch closely for changes in your health, and be sure to contact your doctor if:     Your swelling is getting worse.      You have new or worsening pain in your legs.      You do not get better as expected. Where can you learn more? Go to [email or web address]. org and sign in to your [employer] account. Enter K121 in the [facility] box to learn more about \"Leg and Ankle Edema: Care Instructions. \"     If you do not have an account, please click on the \"Sign Up Now\" link. Current as of: July 1, 2021               Content Version: 13.2  © 2006-2022 [employer]. Care instructions adapted under license by [facility] ([facility]). If you have questions about a medical condition or this instruction, always ask your healthcare professional. [place] [address] any warranty or liability for your use of this information.

## 2022-04-12 RX ORDER — FUROSEMIDE 20 MG/1
20 TABLET ORAL
Qty: 15 TABLET | Refills: 1 | Status: SHIPPED | OUTPATIENT
Start: 2022-04-13

## 2022-04-12 NOTE — TELEPHONE ENCOUNTER
Patient seen Loida French in Urgent Care and was given Lasix, patient would like Lasix three times a week.  Is this something that we can do

## 2022-08-30 ENCOUNTER — OFFICE VISIT (OUTPATIENT)
Dept: PRIMARY CARE CLINIC | Age: 32
End: 2022-08-30
Payer: COMMERCIAL

## 2022-08-30 VITALS
HEIGHT: 62 IN | HEART RATE: 67 BPM | TEMPERATURE: 96.5 F | SYSTOLIC BLOOD PRESSURE: 110 MMHG | OXYGEN SATURATION: 98 % | WEIGHT: 205 LBS | DIASTOLIC BLOOD PRESSURE: 80 MMHG | BODY MASS INDEX: 37.73 KG/M2

## 2022-08-30 DIAGNOSIS — B36.9 FUNGAL DERMATITIS: Primary | ICD-10-CM

## 2022-08-30 DIAGNOSIS — R21 RASH: ICD-10-CM

## 2022-08-30 PROCEDURE — 99213 OFFICE O/P EST LOW 20 MIN: CPT | Performed by: NURSE PRACTITIONER

## 2022-08-30 RX ORDER — METFORMIN HYDROCHLORIDE EXTENDED-RELEASE TABLETS 500 MG/1
1000 TABLET, FILM COATED, EXTENDED RELEASE ORAL DAILY
COMMUNITY
Start: 2022-07-29

## 2022-08-30 RX ORDER — LEVOTHYROXINE AND LIOTHYRONINE 38; 9 UG/1; UG/1
60 TABLET ORAL DAILY
COMMUNITY

## 2022-08-30 RX ORDER — LETROZOLE 2.5 MG/1
2.5 TABLET, FILM COATED ORAL DAILY
COMMUNITY

## 2022-08-30 ASSESSMENT — ENCOUNTER SYMPTOMS
COUGH: 0
GASTROINTESTINAL NEGATIVE: 1
SHORTNESS OF BREATH: 0
EYE PAIN: 0
EYES NEGATIVE: 1

## 2022-08-30 ASSESSMENT — PATIENT HEALTH QUESTIONNAIRE - PHQ9
SUM OF ALL RESPONSES TO PHQ QUESTIONS 1-9: 0
SUM OF ALL RESPONSES TO PHQ QUESTIONS 1-9: 0
2. FEELING DOWN, DEPRESSED OR HOPELESS: 0
SUM OF ALL RESPONSES TO PHQ QUESTIONS 1-9: 0
1. LITTLE INTEREST OR PLEASURE IN DOING THINGS: 0
SUM OF ALL RESPONSES TO PHQ QUESTIONS 1-9: 0
SUM OF ALL RESPONSES TO PHQ9 QUESTIONS 1 & 2: 0

## 2022-08-30 NOTE — PROGRESS NOTES
[address] [facility] A department of [facility]  Phone: [phone]  Fax: [phone]      Danika Cristina is a 29 y.o. female who presents to [facility] today for her medical conditions/complaints as noted below. Danika Cristina is c/o of Skin Problem (Rash on R foot been there for 2 weeks. Otc medications and trimicilone and nothing has worked. )          HPI:     Rash  This is a new problem. The current episode started 1 to 4 weeks ago (2 weeks). The problem is unchanged. The affected locations include the right foot and right hand. The rash is characterized by redness and itchiness. It is unknown if there was an exposure to a precipitant. Pertinent negatives include no cough, eye pain, fatigue, fever, joint pain or shortness of breath. Past treatments include topical steroids and antihistamine. The treatment provided no relief. There is no history of allergies, asthma or eczema. Past Medical History:   Diagnosis Date    Abnormal Pap smear of cervix 09/22/2014    HPV    Breast disorder     sore breasts increased caffeine use    Chicken pox 12/1992    Depression     Herpes simplex virus (HSV) infection     Hiatal hernia 2016    Postpartum depression     Rh sensitized     Vaginal delivery 2012        No Known Allergies    Wt Readings from Last 3 Encounters:   08/30/22 205 lb (93 kg)   04/08/22 210 lb (95.3 kg)   10/10/21 198 lb 3.2 oz (89.9 kg)     BP Readings from Last 3 Encounters:   08/30/22 110/80   04/08/22 110/78   10/10/21 118/80      Temp Readings from Last 3 Encounters:   08/30/22 (!) 96.5 °F (35.8 °C) (Tympanic)   04/08/22 98.2 °F (36.8 °C) (Tympanic)   10/10/21 98.1 °F (36.7 °C)     Pulse Readings from Last 3 Encounters:   08/30/22 67   04/08/22 78   10/10/21 84     SpO2 Readings from Last 3 Encounters:   08/30/22 98%   04/08/22 98%   10/10/21 98%       Subjective:      Review of Systems   Constitutional: Negative. Negative for chills, fatigue and fever. HENT: Negative. Eyes: Negative. Negative for pain. Respiratory:  Negative for cough and shortness of breath. Cardiovascular: Negative. Gastrointestinal: Negative. Endocrine: Negative. Genitourinary: Negative. Musculoskeletal: Negative. Negative for joint pain. Skin:  Positive for rash (right foot and hand). Neurological: Negative. Hematological: Negative. Psychiatric/Behavioral: Negative. All other systems reviewed and are negative. Objective:     Vitals:    08/30/22 0839   BP: 110/80   Pulse: 67   Temp: (!) 96.5 °F (35.8 °C)   TempSrc: Tympanic   SpO2: 98%   Weight: 205 lb (93 kg)   Height: 5' 2\" (1.575 m)     Body mass index is 37.49 kg/m². /80   Pulse 67   Temp (!) 96.5 °F (35.8 °C) (Tympanic)   Ht 5' 2\" (1.575 m)   Wt 205 lb (93 kg)   SpO2 98%   BMI 37.49 kg/m²   Physical Exam  Vitals and nursing note reviewed. Constitutional:       General: She is not in acute distress. Appearance: She is well-developed. HENT:      Nose: Nose normal.      Mouth/Throat:      Mouth: Mucous membranes are moist.   Eyes:      Conjunctiva/sclera: Conjunctivae normal.      Pupils: Pupils are equal, round, and reactive to light. Neck:      Thyroid: No thyromegaly. Cardiovascular:      Rate and Rhythm: Normal rate and regular rhythm. Pulses: Normal pulses. Heart sounds: Normal heart sounds. No murmur heard. Pulmonary:      Effort: Pulmonary effort is normal. No respiratory distress. Breath sounds: Normal breath sounds. No decreased breath sounds, wheezing, rhonchi or rales. Abdominal:      Palpations: Abdomen is soft. Musculoskeletal:         General: Normal range of motion. Cervical back: Normal range of motion and neck supple. Lymphadenopathy:      Cervical: No cervical adenopathy. Skin:     General: Skin is warm and dry. Capillary Refill: Capillary refill takes less than 2 seconds. Findings: Rash present. Rash is macular. Rash is not nodular or vesicular. Neurological:      General: No focal deficit present. Mental Status: She is alert and oriented to person, place, and time. Mental status is at baseline. Psychiatric:         Mood and Affect: Mood normal.         Behavior: Behavior normal.         Judgment: Judgment normal.       Assessment:      Diagnosis Orders   1. Fungal dermatitis  nystatin-triamcinolone (MYCOLOG II) 800672-7.1 UNIT/GM-% cream      2. Rash  nystatin-triamcinolone (MYCOLOG II) 663049-0.1 UNIT/GM-% cream          Plan:   Rash to top of right foot for over 2 weeks that has not responded to topical steroid cream. Will try Nystatin/triamcinolone cream.    She was offered steroid injection as she states that this has helped in the past but she declined and states that she will return for injection if no improvement with different cream.    Discussed exam, POCT findings, plan of care, and follow-up at length with patient. Reviewed all prescribed and recommended medications, administration and side effects. Encouraged patient to follow up with PCP or return to the clinic for no improvement and or worsening of symptoms. Patient instructed to go to ER or call 911 if any difficulty breathing, shortness of breath, inability to swallow, hives or temp greater than 103 degrees. All questions were answered and they verbalized understanding and were agreeable with the plan. Return if symptoms worsen or fail to improve.         Electronically signed by LAWANDA Solis CNP on 8/30/2022 at 10:41 AM

## 2022-10-31 ENCOUNTER — OFFICE VISIT (OUTPATIENT)
Dept: OPTOMETRY | Age: 32
End: 2022-10-31
Payer: COMMERCIAL

## 2022-10-31 ENCOUNTER — OFFICE VISIT (OUTPATIENT)
Dept: FAMILY MEDICINE CLINIC | Age: 32
End: 2022-10-31
Payer: COMMERCIAL

## 2022-10-31 VITALS
OXYGEN SATURATION: 98 % | BODY MASS INDEX: 36.62 KG/M2 | SYSTOLIC BLOOD PRESSURE: 128 MMHG | HEART RATE: 68 BPM | WEIGHT: 199 LBS | HEIGHT: 62 IN | DIASTOLIC BLOOD PRESSURE: 78 MMHG

## 2022-10-31 DIAGNOSIS — H52.13 MYOPIA OF BOTH EYES WITH ASTIGMATISM: Primary | ICD-10-CM

## 2022-10-31 DIAGNOSIS — F41.9 ANXIETY: ICD-10-CM

## 2022-10-31 DIAGNOSIS — Z00.00 ROUTINE GENERAL MEDICAL EXAMINATION AT A HEALTH CARE FACILITY: Primary | ICD-10-CM

## 2022-10-31 DIAGNOSIS — H52.203 MYOPIA OF BOTH EYES WITH ASTIGMATISM: Primary | ICD-10-CM

## 2022-10-31 DIAGNOSIS — Z13.220 LIPID SCREENING: ICD-10-CM

## 2022-10-31 PROCEDURE — 92015 DETERMINE REFRACTIVE STATE: CPT | Performed by: OPTOMETRIST

## 2022-10-31 PROCEDURE — 92014 COMPRE OPH EXAM EST PT 1/>: CPT | Performed by: OPTOMETRIST

## 2022-10-31 PROCEDURE — 92310 CONTACT LENS FITTING OU: CPT | Performed by: OPTOMETRIST

## 2022-10-31 PROCEDURE — 99395 PREV VISIT EST AGE 18-39: CPT | Performed by: NURSE PRACTITIONER

## 2022-10-31 RX ORDER — LETROZOLE 2.5 MG/1
2.5 TABLET, FILM COATED ORAL DAILY
COMMUNITY

## 2022-10-31 RX ORDER — ASPIRIN 81 MG/1
81 TABLET, CHEWABLE ORAL DAILY
COMMUNITY

## 2022-10-31 RX ORDER — SEMAGLUTIDE 1.34 MG/ML
0.5 INJECTION, SOLUTION SUBCUTANEOUS WEEKLY
COMMUNITY
Start: 2022-09-29

## 2022-10-31 SDOH — ECONOMIC STABILITY: FOOD INSECURITY: WITHIN THE PAST 12 MONTHS, THE FOOD YOU BOUGHT JUST DIDN'T LAST AND YOU DIDN'T HAVE MONEY TO GET MORE.: NEVER TRUE

## 2022-10-31 SDOH — ECONOMIC STABILITY: FOOD INSECURITY: WITHIN THE PAST 12 MONTHS, YOU WORRIED THAT YOUR FOOD WOULD RUN OUT BEFORE YOU GOT MONEY TO BUY MORE.: NEVER TRUE

## 2022-10-31 ASSESSMENT — REFRACTION_CURRENTRX
OS_BRAND: BIOFINITY TORIC
OD_BRAND: BIOFINITY TORIC
OD_CYLINDER: -1.25
OD_AXIS: 180
OS_BASECURVE: 8.7
OS_CYLINDER: -1.75
OD_BASECURVE: 8.7
OD_SPHERE: -4.25
OS_AXIS: 170
OS_SPHERE: -4.25

## 2022-10-31 ASSESSMENT — KERATOMETRY
METHOD_AUTO_MANUAL: AUTOMATED
OS_AXISANGLE_DEGREES: 081
OD_AXISANGLE_DEGREES: 092
OD_K2POWER_DIOPTERS: 46.00
OD_K1POWER_DIOPTERS: 44.00
OS_K1POWER_DIOPTERS: 43.50
OS_K2POWER_DIOPTERS: 45.75
OS_AXISANGLE2_DEGREES: 171
OD_AXISANGLE2_DEGREES: 002

## 2022-10-31 ASSESSMENT — TONOMETRY
OS_IOP_MMHG: 16
IOP_METHOD: NON-CONTACT AIR PUFF
OD_IOP_MMHG: 19

## 2022-10-31 ASSESSMENT — PATIENT HEALTH QUESTIONNAIRE - PHQ9
3. TROUBLE FALLING OR STAYING ASLEEP: 0
6. FEELING BAD ABOUT YOURSELF - OR THAT YOU ARE A FAILURE OR HAVE LET YOURSELF OR YOUR FAMILY DOWN: 0
SUM OF ALL RESPONSES TO PHQ QUESTIONS 1-9: 0
SUM OF ALL RESPONSES TO PHQ QUESTIONS 1-9: 0
2. FEELING DOWN, DEPRESSED OR HOPELESS: 0
4. FEELING TIRED OR HAVING LITTLE ENERGY: 0
9. THOUGHTS THAT YOU WOULD BE BETTER OFF DEAD, OR OF HURTING YOURSELF: 0
8. MOVING OR SPEAKING SO SLOWLY THAT OTHER PEOPLE COULD HAVE NOTICED. OR THE OPPOSITE, BEING SO FIGETY OR RESTLESS THAT YOU HAVE BEEN MOVING AROUND A LOT MORE THAN USUAL: 0
10. IF YOU CHECKED OFF ANY PROBLEMS, HOW DIFFICULT HAVE THESE PROBLEMS MADE IT FOR YOU TO DO YOUR WORK, TAKE CARE OF THINGS AT HOME, OR GET ALONG WITH OTHER PEOPLE: 0
5. POOR APPETITE OR OVEREATING: 0
SUM OF ALL RESPONSES TO PHQ QUESTIONS 1-9: 0
SUM OF ALL RESPONSES TO PHQ QUESTIONS 1-9: 0
1. LITTLE INTEREST OR PLEASURE IN DOING THINGS: 0
SUM OF ALL RESPONSES TO PHQ9 QUESTIONS 1 & 2: 0
7. TROUBLE CONCENTRATING ON THINGS, SUCH AS READING THE NEWSPAPER OR WATCHING TELEVISION: 0

## 2022-10-31 ASSESSMENT — VISUAL ACUITY
OS_CC: 20/20
METHOD: SNELLEN - LINEAR

## 2022-10-31 ASSESSMENT — ENCOUNTER SYMPTOMS
EYES NEGATIVE: 0
ALLERGIC/IMMUNOLOGIC NEGATIVE: 0
WHEEZING: 0
COUGH: 0
RESPIRATORY NEGATIVE: 0
GASTROINTESTINAL NEGATIVE: 0
SHORTNESS OF BREATH: 0

## 2022-10-31 ASSESSMENT — REFRACTION_WEARINGRX
OD_AXIS: 180
OS_SPHERE: -4.25
OS_CYLINDER: -2.50
OD_SPHERE: -4.25
OS_AXIS: 170
OD_CYLINDER: -1.75
SPECS_TYPE: SVL

## 2022-10-31 ASSESSMENT — ANXIETY QUESTIONNAIRES
2. NOT BEING ABLE TO STOP OR CONTROL WORRYING: 0
GAD7 TOTAL SCORE: 0
5. BEING SO RESTLESS THAT IT IS HARD TO SIT STILL: 0
3. WORRYING TOO MUCH ABOUT DIFFERENT THINGS: 0
IF YOU CHECKED OFF ANY PROBLEMS ON THIS QUESTIONNAIRE, HOW DIFFICULT HAVE THESE PROBLEMS MADE IT FOR YOU TO DO YOUR WORK, TAKE CARE OF THINGS AT HOME, OR GET ALONG WITH OTHER PEOPLE: NOT DIFFICULT AT ALL
4. TROUBLE RELAXING: 0
7. FEELING AFRAID AS IF SOMETHING AWFUL MIGHT HAPPEN: 0
6. BECOMING EASILY ANNOYED OR IRRITABLE: 0
1. FEELING NERVOUS, ANXIOUS, OR ON EDGE: 0

## 2022-10-31 ASSESSMENT — REFRACTION_MANIFEST
OD_SPHERE: -4.25
OS_SPHERE: -4.50
OS_AXIS: 170
OD_CYLINDER: -2.00
OS_CYLINDER: -2.50
OD_AXIS: 180

## 2022-10-31 ASSESSMENT — SOCIAL DETERMINANTS OF HEALTH (SDOH): HOW HARD IS IT FOR YOU TO PAY FOR THE VERY BASICS LIKE FOOD, HOUSING, MEDICAL CARE, AND HEATING?: NOT HARD AT ALL

## 2022-10-31 NOTE — PROGRESS NOTES
Katt Hunt presents today for   Chief Complaint   Patient presents with    Vision Exam   .    HPI    Last Vision Exam:10/05/21 AW  Last Ophthalmology Exam:   Last Filled Glasses Rx: 10/05/21  Insurance: [employer]  Update: Glasses and Contacts  Distance is getting more blurry  Update glasses and contact lenses  Last wore the contact lenses yesterday  Wears glasses and contact lenses both;  more glasses since no more masks             ROS    Negative for: Constitutional, Gastrointestinal, Neurological, Skin, Genitourinary, Musculoskeletal, HENT, Endocrine, Cardiovascular, Eyes, Respiratory, Psychiatric, Allergic/Imm, Heme/Lymph         Family History   Problem Relation Age of Onset    Diabetes Father     Diabetes Paternal Grandmother     Cancer Paternal Grandmother     Diabetes Paternal Grandfather     Thyroid Disease Paternal Grandfather     High Blood Pressure Paternal Grandfather     Cataracts Neg Hx     Glaucoma Neg Hx        Social History     Socioeconomic History    Marital status:      Spouse name: None    Number of children: None    Years of education: None    Highest education level: None   Tobacco Use    Smoking status: Never    Smokeless tobacco: Never   Vaping Use    Vaping Use: Never used   Substance and Sexual Activity    Alcohol use: Yes     Comment: occasionally    Drug use: Never    Sexual activity: Yes     Partners: Male     Comment: fimonae     Past Medical History:   Diagnosis Date    Abnormal Pap smear of cervix 09/22/2014    HPV    Breast disorder     sore breasts increased caffeine use    Chicken pox 12/1992    Depression     Herpes simplex virus (HSV) infection     Hiatal hernia 2016    Postpartum depression     Rh sensitized     Vaginal delivery 2012       Neuro/Psych       Neuro/Psych       Oriented x3: Yes    Mood/Affect: Normal                    Main Ophthalmology Exam       External Exam         Right Left    External Normal Normal                   <div id=\"MAIN_EXAM_REVIEWED\"></div>     Tonometry       Tonometry (Non-contact air puff, 9:03 AM)         Right Left    Pressure 19 16   IOP.9             15.8  CH:  9.0          10.3  WS: 3.2          8.4                       Visual Acuity (Snellen - Linear)         Right Left    Dist cc 20/25 20/20          Keratometry       Keratometry (Automated)         K1 Axis K2 Axis    Right 44.00 002 46.00 092    Left 43.50 171 45.75 081                    Ophthalmology Exam       Wearing Rx         Sphere Cylinder Axis    Right -4.25 -1.75 180    Left -4.25 -2.50 170      Age: 4yrs    Type: SVL                    Manifest Refraction       Manifest Refraction         Sphere Cylinder [place] Dist [place]    Right -4.25 -2.00 180 20/20    Left -4.50 -2.50 170 20/20              Manifest Refraction #2 (Auto)         Sphere Cylinder Welch Dist VA    Right -4.25 -2.00 178     Left -4.25 -2.25 168                      Final Rx         Sphere Cylinder Welch Dist VA    Right -4.25 -2.00 180 20/20    Left -4.50 -2.50 170 20/20      Type: SVL    Expiration Date: 10/31/2024             Contact Lens Current Rx       Current Contact Lens Rx         Brand Base Curve Sphere Cylinder Axis    Right Biofinity Toric 8.7 -4.25 -1.25 180    Left Biofinity Toric 8.7 -4.25 -1.75 170                    Final   Final Contact Lens Rx         Brand Base Curve Sphere Cylinder Axis    Right Biofinity Toric 8.7 -4.25 -1.25 180    Left Biofinity Toric 8.7 -4.25 -1.75 170      Expiration Date: 2023    Replacement: Monthly    Wearing Schedule: Daily wear             IMPRESSION:  1. Myopia of both eyes with astigmatism        PLAN:    New glasses and contact lens rx printed; There are no Patient Instructions on file for this visit.    Return in about 2 years (around 10/31/2024) for complete eye exam.

## 2022-10-31 NOTE — PROGRESS NOTES
[address] [place] [address]  [address] [address] Gabriel Taylor, [id number]  [phone]      HPI:     HPI  Pt presents to the clinic for an annual physical. She was recently diagnosed with PCOS and is seeing an endocrinologist and fertility specialist. She had a miscarriage in August. Periods have been irregular over the last 2 months. She is on metformin and ozempic for weight loss. She does think that it is working and is tolerating the side effects. She will be due for labs for her fertility specialist in a few weeks. She is due for a lipid panel for screening purposes. She is handling the stress fairly well. She will occasionally have to take a vistaril but not often. She is sleeping ok. She denies thoughts of suicide or homicide. She sees a dentist yearly. She also follows with a optometrist yearly. She has no concerns today. Current Outpatient Medications   Medication Sig Dispense Refill    Semaglutide,0.25 or 0.5MG/DOS, (OZEMPIC, 0.25 OR 0.5 MG/DOSE,) 2 MG/1.5ML SOPN Inject 0.5 mg into the skin once a week      letrozole (FEMARA) 2.5 MG tablet Take 2.5 mg by mouth daily Chance day 3 through 7      Magnesium 100 MG CAPS Take 235 mg by mouth      aspirin 81 MG chewable tablet Take 81 mg by mouth daily      metFORMIN, OSM, (FORTAMET) 500 MG extended release tablet Take 1,000 mg by mouth daily      thyroid (ARMOUR) 60 MG tablet Take 60 mg by mouth daily      letrozole (FEMARA) 2.5 MG tablet Take 2.5 mg by mouth daily      nystatin-triamcinolone (MYCOLOG II) 936793-6.1 UNIT/GM-% cream Apply topically 2 times daily.  1 each 0    furosemide (LASIX) 20 MG tablet Take 1 tablet by mouth three times a week 15 tablet 1    valACYclovir (VALTREX) 1 g tablet 2 TABS AT ONSET OF COLD SORE, REPEAT DOSE IN 12 HOURS ONCE 20 tablet 3    SUMAtriptan (IMITREX) 50 MG tablet Take 1 tablet by mouth once as needed for Migraine 9 tablet 1    ketorolac (TORADOL) 10 MG tablet Take 1 tablet by mouth every 6 hours as needed for Pain (Patient not taking: No sig reported) 20 tablet 0    triamcinolone (KENALOG) 0.1 % cream Apply topically 3 times daily for 7-10 days as needed 60 g 1    vitamin D (ERGOCALCIFEROL) 1.25 MG (49904 UT) CAPS capsule Take 1 capsule by mouth once a week 12 capsule 1    valACYclovir (VALTREX) 500 MG tablet Take 1 tablet by mouth daily 90 tablet 3    clotrimazole-betamethasone (LOTRISONE) 1-0.05 % cream Apply topically 2 times daily. (Patient not taking: No sig reported) 1 Tube 1     No current facility-administered medications for this visit. Allergies   Allergen Reactions    Adhesive Tape Other (See Comments)     Red rash    Other Hives     Body glue       All patients pastmedical, surgical, social and family history has been reviewed. Subjective:      Review of Systems   Constitutional:  Negative for activity change, appetite change, fatigue and fever. Respiratory:  Negative for cough, shortness of breath and wheezing. Cardiovascular:  Negative for chest pain and palpitations. Genitourinary:  Positive for menstrual problem. Musculoskeletal: Negative. Psychiatric/Behavioral: Negative. Objective:      Physical Exam  Vitals and nursing note reviewed. Constitutional:       Appearance: Normal appearance. HENT:      Head: Normocephalic and atraumatic. Right Ear: Tympanic membrane, ear canal and external ear normal.      Left Ear: Tympanic membrane, ear canal and external ear normal.      Mouth/Throat:      Mouth: Mucous membranes are moist.      Pharynx: Oropharynx is clear. No oropharyngeal exudate or posterior oropharyngeal erythema. Cardiovascular:      Rate and Rhythm: Normal rate and regular rhythm. Heart sounds: Normal heart sounds. Pulmonary:      Effort: Pulmonary effort is normal.      Breath sounds: Normal breath sounds. Skin:     Capillary Refill: Capillary refill takes less than 2 seconds. Neurological:      General: No focal deficit present.       Mental Status: She is alert and oriented to person, place, and time. Assessment:       Diagnosis Orders   1. Routine general medical examination at a health care facility        2. Lipid screening  Lipid Panel      3. Anxiety            Plan:      Encouraged healthy diet and daily exercise as tolerated  Continue with specialist  Lipid panel ordered to be done with other blood work  Anxiety appears stable at this time. Pt to return in 1 year for annual physical   Pt to return PRN  Return in about 1 year (around 10/31/2023), or if symptoms worsen or fail to improve. Orders Placed This Encounter   Procedures    Lipid Panel     Standing Status:   Future     Standing Expiration Date:   10/31/2023     Order Specific Question:   Is Patient Fasting?/# of Hours     Answer:   12     No orders of the defined types were placed in this encounter. Patient given educational materials - see patient instructions. All patient questionsanswered. Pt voiced understanding. Reviewed health maintenance.      Electronically signed by LAWANDA Sinha CNP, CNP on 10/31/2022 at 12:45 PM

## 2023-01-03 ENCOUNTER — PATIENT MESSAGE (OUTPATIENT)
Dept: FAMILY MEDICINE CLINIC | Age: 33
End: 2023-01-03

## 2023-01-03 DIAGNOSIS — B00.1 HERPES LABIALIS: ICD-10-CM

## 2023-01-03 RX ORDER — VALACYCLOVIR HYDROCHLORIDE 1 G/1
TABLET, FILM COATED ORAL
Qty: 20 TABLET | Refills: 3 | Status: SHIPPED | OUTPATIENT
Start: 2023-01-03

## 2023-01-03 NOTE — TELEPHONE ENCOUNTER
From: Isabelle George  To:  Masoud Santiago  Sent: 1/3/2023 8:18 AM EST  Subject: Valtrex     I have a cold sore coming on can I get the 1gram sent in to [facility]

## 2023-03-07 ENCOUNTER — TELEMEDICINE (OUTPATIENT)
Dept: FAMILY MEDICINE CLINIC | Age: 33
End: 2023-03-07
Payer: COMMERCIAL

## 2023-03-07 DIAGNOSIS — R51.9 NONINTRACTABLE EPISODIC HEADACHE, UNSPECIFIED HEADACHE TYPE: Primary | ICD-10-CM

## 2023-03-07 PROCEDURE — 99213 OFFICE O/P EST LOW 20 MIN: CPT | Performed by: NURSE PRACTITIONER

## 2023-03-07 RX ORDER — RIZATRIPTAN BENZOATE 10 MG/1
10 TABLET ORAL
Qty: 30 TABLET | Refills: 3 | Status: SHIPPED | OUTPATIENT
Start: 2023-03-07 | End: 2023-03-07

## 2023-03-07 RX ORDER — KETOROLAC TROMETHAMINE 10 MG/1
10 TABLET, FILM COATED ORAL EVERY 6 HOURS PRN
Qty: 20 TABLET | Refills: 0 | Status: SHIPPED | OUTPATIENT
Start: 2023-03-07

## 2023-03-07 RX ORDER — TIRZEPATIDE 5 MG/.5ML
INJECTION, SOLUTION SUBCUTANEOUS
COMMUNITY

## 2023-03-07 RX ORDER — PANTOPRAZOLE SODIUM 40 MG/1
TABLET, DELAYED RELEASE ORAL DAILY
COMMUNITY
Start: 2023-02-17 | End: 2023-03-23

## 2023-03-07 RX ORDER — LEVOTHYROXINE, LIOTHYRONINE 57; 13.5 UG/1; UG/1
TABLET ORAL
COMMUNITY
Start: 2023-02-09

## 2023-03-07 SDOH — ECONOMIC STABILITY: FOOD INSECURITY: WITHIN THE PAST 12 MONTHS, YOU WORRIED THAT YOUR FOOD WOULD RUN OUT BEFORE YOU GOT MONEY TO BUY MORE.: NEVER TRUE

## 2023-03-07 SDOH — ECONOMIC STABILITY: FOOD INSECURITY: WITHIN THE PAST 12 MONTHS, THE FOOD YOU BOUGHT JUST DIDN'T LAST AND YOU DIDN'T HAVE MONEY TO GET MORE.: NEVER TRUE

## 2023-03-07 SDOH — ECONOMIC STABILITY: HOUSING INSECURITY
IN THE LAST 12 MONTHS, WAS THERE A TIME WHEN YOU DID NOT HAVE A STEADY PLACE TO SLEEP OR SLEPT IN A SHELTER (INCLUDING NOW)?: NO

## 2023-03-07 SDOH — ECONOMIC STABILITY: INCOME INSECURITY: HOW HARD IS IT FOR YOU TO PAY FOR THE VERY BASICS LIKE FOOD, HOUSING, MEDICAL CARE, AND HEATING?: NOT HARD AT ALL

## 2023-03-07 ASSESSMENT — PATIENT HEALTH QUESTIONNAIRE - PHQ9
SUM OF ALL RESPONSES TO PHQ9 QUESTIONS 1 & 2: 0
SUM OF ALL RESPONSES TO PHQ QUESTIONS 1-9: 0
SUM OF ALL RESPONSES TO PHQ QUESTIONS 1-9: 0
10. IF YOU CHECKED OFF ANY PROBLEMS, HOW DIFFICULT HAVE THESE PROBLEMS MADE IT FOR YOU TO DO YOUR WORK, TAKE CARE OF THINGS AT HOME, OR GET ALONG WITH OTHER PEOPLE: 0
9. THOUGHTS THAT YOU WOULD BE BETTER OFF DEAD, OR OF HURTING YOURSELF: 0
SUM OF ALL RESPONSES TO PHQ QUESTIONS 1-9: 0
6. FEELING BAD ABOUT YOURSELF - OR THAT YOU ARE A FAILURE OR HAVE LET YOURSELF OR YOUR FAMILY DOWN: 0
3. TROUBLE FALLING OR STAYING ASLEEP: 0
4. FEELING TIRED OR HAVING LITTLE ENERGY: 0
2. FEELING DOWN, DEPRESSED OR HOPELESS: 0
8. MOVING OR SPEAKING SO SLOWLY THAT OTHER PEOPLE COULD HAVE NOTICED. OR THE OPPOSITE, BEING SO FIGETY OR RESTLESS THAT YOU HAVE BEEN MOVING AROUND A LOT MORE THAN USUAL: 0
1. LITTLE INTEREST OR PLEASURE IN DOING THINGS: 0
5. POOR APPETITE OR OVEREATING: 0
7. TROUBLE CONCENTRATING ON THINGS, SUCH AS READING THE NEWSPAPER OR WATCHING TELEVISION: 0
SUM OF ALL RESPONSES TO PHQ QUESTIONS 1-9: 0

## 2023-03-07 ASSESSMENT — ANXIETY QUESTIONNAIRES
5. BEING SO RESTLESS THAT IT IS HARD TO SIT STILL: 0
1. FEELING NERVOUS, ANXIOUS, OR ON EDGE: 0
7. FEELING AFRAID AS IF SOMETHING AWFUL MIGHT HAPPEN: 0
GAD7 TOTAL SCORE: 0
2. NOT BEING ABLE TO STOP OR CONTROL WORRYING: 0
3. WORRYING TOO MUCH ABOUT DIFFERENT THINGS: 0
4. TROUBLE RELAXING: 0
IF YOU CHECKED OFF ANY PROBLEMS ON THIS QUESTIONNAIRE, HOW DIFFICULT HAVE THESE PROBLEMS MADE IT FOR YOU TO DO YOUR WORK, TAKE CARE OF THINGS AT HOME, OR GET ALONG WITH OTHER PEOPLE: NOT DIFFICULT AT ALL
6. BECOMING EASILY ANNOYED OR IRRITABLE: 0

## 2023-03-07 NOTE — PROGRESS NOTES
Melissa Crowley (:  1990) is a Established patient, here for evaluation of the following:    Assessment & Plan   Below is the assessment and plan developed based on review of pertinent history, physical exam, labs, studies, and medications. 1. Nonintractable episodic headache, unspecified headache type-will renew the toradol to use for severe headaches. Will give a script for maxalt to use for less severe headaches. Will sign her FMLA. Pt is still planning on a pregnancy so will not start a prophylactic at this time. Pt to return In  3 months for follow up  Pt to return PRN    No follow-ups on file. Subjective   HPI  Pt presents to the clinic via a virtual visit to discuss headaches. She states that she was previously on propanolol but was weaned off because they did not want her on that medication for long term. She has been having more frequent headaches since completely weaning off. She had a toradol script that she uses when the headaches are severe. She used to also have imitrex to use which did not work as well. She is requesting to have FMLA in place for the severe times for her headaches. She has no further concerns.      Past Medical History:   Diagnosis Date    Abnormal Pap smear of cervix 2014    HPV    Breast disorder     sore breasts increased caffeine use    Chicken pox 1992    Depression     Herpes simplex virus (HSV) infection     Hiatal hernia 2016    Postpartum depression     Rh sensitized     Vaginal delivery        Past Surgical History:   Procedure Laterality Date    COLONOSCOPY  2016    Old Forteric Valentine     HYSTEROSCOPY  2022    with D and C    LAPAROSCOPY  2022    diagnostic    LAPAROSCOPY Bilateral 2022    of adhesions with right fimbrimbrobloblasty    UPPER GASTROINTESTINAL ENDOSCOPY  2016    XR HYSTEROSALPINGOGRAM INJECTION Bilateral 2022    shsg with bilateral tcft       Social History     Socioeconomic History    Marital status:    Tobacco Use    Smoking status: Never    Smokeless tobacco: Never   Vaping Use    Vaping Use: Never used   Substance and Sexual Activity    Alcohol use: Yes     Comment: occasionally    Drug use: Never    Sexual activity: Yes     Partners: Male     Comment: fiance     Social Determinants of Health     Financial Resource Strain: Low Risk     Difficulty of Paying Living Expenses: Not hard at all   Food Insecurity: No Food Insecurity    Worried About Running Out of Food in the Last Year: Never true    Ran Out of Food in the Last Year: Never true   Transportation Needs: Unknown    Lack of Transportation (Non-Medical): No   Housing Stability: Unknown    Unstable Housing in the Last Year: No       Family History   Problem Relation Age of Onset    Diabetes Father     Diabetes Paternal Grandmother     Cancer Paternal Grandmother     Diabetes Paternal Grandfather     Thyroid Disease Paternal Grandfather     High Blood Pressure Paternal Grandfather     Cataracts Neg Hx     Glaucoma Neg Hx        Allergies   Allergen Reactions    Adhesive Tape Other (See Comments)     Red rash    Other Hives     Body glue       Current Outpatient Medications   Medication Sig Dispense Refill    pantoprazole (PROTONIX) 40 MG tablet Take by mouth daily      ketorolac (TORADOL) 10 MG tablet Take 1 tablet by mouth every 6 hours as needed for Pain 20 tablet 0    rizatriptan (MAXALT) 10 MG tablet Take 1 tablet by mouth once as needed for Migraine May repeat in 2 hours if needed 30 tablet 3    Tirzepatide (MOUNJARO) 5 MG/0.5ML SOPN SC injection       NP THYROID 90 MG tablet       valACYclovir (VALTREX) 1 g tablet 2 TABS AT ONSET OF COLD SORE, REPEAT DOSE IN 12 HOURS ONCE 20 tablet 3    Magnesium 100 MG CAPS Take 235 mg by mouth      aspirin 81 MG chewable tablet Take 81 mg by mouth daily      nystatin-triamcinolone (MYCOLOG II) 920229-4.1 UNIT/GM-% cream Apply topically 2 times daily.  1 each 0    furosemide (LASIX) 20 MG tablet Take 1 tablet by mouth three times a week 15 tablet 1    SUMAtriptan (IMITREX) 50 MG tablet Take 1 tablet by mouth once as needed for Migraine 9 tablet 1    triamcinolone (KENALOG) 0.1 % cream Apply topically 3 times daily for 7-10 days as needed 60 g 1    vitamin D (ERGOCALCIFEROL) 1.25 MG (45392 UT) CAPS capsule Take 1 capsule by mouth once a week 12 capsule 1    valACYclovir (VALTREX) 500 MG tablet Take 1 tablet by mouth daily 90 tablet 3    clotrimazole-betamethasone (LOTRISONE) 1-0.05 % cream Apply topically 2 times daily. (Patient not taking: No sig reported) 1 Tube 1     No current facility-administered medications for this visit. Review of Systems   Constitutional:  Negative for activity change, appetite change, fatigue and fever. Respiratory:  Negative for cough, shortness of breath and wheezing. Cardiovascular:  Negative for chest pain and palpitations. Neurological:  Positive for headaches.         Objective   Patient-Reported Vitals  Patient-Reported Systolic (Top): 153 mmHg  Patient-Reported Diastolic (Bottom): 65 mmHg  Patient-Reported Pulse: 74  Patient-Reported Weight: 175lb  Patient-Reported Height: 5ft2       Physical Exam  [INSTRUCTIONS:  \"[x]\" Indicates a positive item  \"[]\" Indicates a negative item  -- DELETE ALL ITEMS NOT EXAMINED]    Constitutional: [x] Appears well-developed and well-nourished [x] No apparent distress      [] Abnormal -     Mental status: [x] Alert and awake  [x] Oriented to person/place/time [x] Able to follow commands    [] Abnormal -     Eyes:   EOM    [x]  Normal    [] Abnormal -   Sclera  [x]  Normal    [] Abnormal -          Discharge [x]  None visible   [] Abnormal -     HENT: [x] Normocephalic, atraumatic  [] Abnormal -   [x] Mouth/Throat: Mucous membranes are moist    External Ears [x] Normal  [] Abnormal -    Neck: [x] No visualized mass [] Abnormal -     Pulmonary/Chest: [x] Respiratory effort normal   [x] No visualized signs of difficulty breathing or respiratory distress        [] Abnormal -      Musculoskeletal:   [x] Normal gait with no signs of ataxia         [x] Normal range of motion of neck        [] Abnormal -     Neurological:        [x] No Facial Asymmetry (Cranial nerve 7 motor function) (limited exam due to video visit)          [x] No gaze palsy        [] Abnormal -          Skin:        [x] No significant exanthematous lesions or discoloration noted on facial skin         [] Abnormal -            Psychiatric:       [x] Normal Affect [] Abnormal -        [x] No Hallucinations                 Indira Varghese was evaluated through a synchronous (real-time) audio-video encounter. The patient (or guardian if applicable) is aware that this is a billable service, which includes applicable co-pays. This Virtual Visit was conducted with patient's (and/or legal guardian's) consent. The visit was conducted pursuant to the emergency declaration under the [address] [facility], [facility] authority and the [employer] Act. Patient identification was verified, and a caregiver was present when appropriate.    The patient was located at Home: [address] [place] [id number]  Provider was located at Home ([facility]): Shiv 68, APRN - CNP

## 2023-03-08 ASSESSMENT — ENCOUNTER SYMPTOMS
WHEEZING: 0
COUGH: 0
SHORTNESS OF BREATH: 0

## 2023-03-31 DIAGNOSIS — R60.9 SWELLING: ICD-10-CM

## 2023-03-31 RX ORDER — FUROSEMIDE 20 MG/1
20 TABLET ORAL
Qty: 15 TABLET | Refills: 1 | Status: SHIPPED | OUTPATIENT
Start: 2023-03-31

## 2023-03-31 NOTE — TELEPHONE ENCOUNTER
Indira called requesting a refill of the below medication which has been pended for you:     Requested Prescriptions     Pending Prescriptions Disp Refills    furosemide (LASIX) 20 MG tablet 15 tablet 1     Sig: Take 1 tablet by mouth three times a week       Last Appointment Date: 3/7/2023  Next Appointment Date: Visit date not found    Allergies   Allergen Reactions    Adhesive Tape Other (See Comments)     Red rash    Other Hives     Body glue

## 2023-10-24 ENCOUNTER — PATIENT MESSAGE (OUTPATIENT)
Dept: FAMILY MEDICINE CLINIC | Age: 33
End: 2023-10-24

## 2023-10-24 DIAGNOSIS — E55.9 VITAMIN D DEFICIENCY: Primary | ICD-10-CM

## 2023-10-25 NOTE — TELEPHONE ENCOUNTER
From: Magda Aaron  To: Kerry Sevilla  Sent: 10/24/2023 5:40 PM EDT  Subject: Labs     Are there any labs I need to get done?  If so can you fax me the order to [phone]

## 2023-11-03 ENCOUNTER — OFFICE VISIT (OUTPATIENT)
Dept: FAMILY MEDICINE CLINIC | Age: 33
End: 2023-11-03
Payer: COMMERCIAL

## 2023-11-03 VITALS
RESPIRATION RATE: 16 BRPM | WEIGHT: 165.6 LBS | SYSTOLIC BLOOD PRESSURE: 106 MMHG | HEIGHT: 62 IN | BODY MASS INDEX: 30.47 KG/M2 | DIASTOLIC BLOOD PRESSURE: 82 MMHG | HEART RATE: 61 BPM | OXYGEN SATURATION: 99 %

## 2023-11-03 DIAGNOSIS — Z23 FLU VACCINE NEED: ICD-10-CM

## 2023-11-03 DIAGNOSIS — Z00.00 ROUTINE GENERAL MEDICAL EXAMINATION AT A HEALTH CARE FACILITY: Primary | ICD-10-CM

## 2023-11-03 PROCEDURE — 90686 IIV4 VACC NO PRSV 0.5 ML IM: CPT | Performed by: NURSE PRACTITIONER

## 2023-11-03 PROCEDURE — 90471 IMMUNIZATION ADMIN: CPT | Performed by: NURSE PRACTITIONER

## 2023-11-03 PROCEDURE — 99395 PREV VISIT EST AGE 18-39: CPT | Performed by: NURSE PRACTITIONER

## 2023-11-03 RX ORDER — SYRINGE WITH NEEDLE, 1 ML 25GX5/8"
SYRINGE, EMPTY DISPOSABLE MISCELLANEOUS
COMMUNITY
Start: 2023-08-22

## 2023-11-03 ASSESSMENT — PATIENT HEALTH QUESTIONNAIRE - PHQ9
6. FEELING BAD ABOUT YOURSELF - OR THAT YOU ARE A FAILURE OR HAVE LET YOURSELF OR YOUR FAMILY DOWN: 0
5. POOR APPETITE OR OVEREATING: 0
SUM OF ALL RESPONSES TO PHQ QUESTIONS 1-9: 6
9. THOUGHTS THAT YOU WOULD BE BETTER OFF DEAD, OR OF HURTING YOURSELF: 0
10. IF YOU CHECKED OFF ANY PROBLEMS, HOW DIFFICULT HAVE THESE PROBLEMS MADE IT FOR YOU TO DO YOUR WORK, TAKE CARE OF THINGS AT HOME, OR GET ALONG WITH OTHER PEOPLE: 1
SUM OF ALL RESPONSES TO PHQ QUESTIONS 1-9: 6
SUM OF ALL RESPONSES TO PHQ QUESTIONS 1-9: 6
1. LITTLE INTEREST OR PLEASURE IN DOING THINGS: 1
3. TROUBLE FALLING OR STAYING ASLEEP: 0
4. FEELING TIRED OR HAVING LITTLE ENERGY: 0
SUM OF ALL RESPONSES TO PHQ9 QUESTIONS 1 & 2: 3
7. TROUBLE CONCENTRATING ON THINGS, SUCH AS READING THE NEWSPAPER OR WATCHING TELEVISION: 3
2. FEELING DOWN, DEPRESSED OR HOPELESS: 2
SUM OF ALL RESPONSES TO PHQ QUESTIONS 1-9: 6
8. MOVING OR SPEAKING SO SLOWLY THAT OTHER PEOPLE COULD HAVE NOTICED. OR THE OPPOSITE, BEING SO FIGETY OR RESTLESS THAT YOU HAVE BEEN MOVING AROUND A LOT MORE THAN USUAL: 0

## 2023-11-03 NOTE — PROGRESS NOTES
[address]  [address] [facility] [address], [id number]  [phone]      HPI:     HPI  Pt presents to the clinic for a yearly physical. She is doing well. She had labs done prior to her visit. They are WNL. She recently had a miscarriage in September. She is doing ok emotionally. 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 She is discussing treatment with her OB/GYN. She is following with endocrinology for her thyroid. She is continuing to watch her diet and exercise. She has no further concerns today. Current Outpatient Medications   Medication Sig Dispense Refill    vitamin D (CHOLECALCIFEROL) 125 MCG (5000 UT) CAPS capsule Take 1 capsule by mouth daily      B-D 3CC LUER-WOODY SYR 90TX2-3/2 22G X 1-1/2\" 3 ML MISC USE ONE SYRINGE WITH EACH INJECTION AS DIRECTED. furosemide (LASIX) 20 MG tablet Take 1 tablet by mouth three times a week 15 tablet 1    NP THYROID 90 MG tablet       valACYclovir (VALTREX) 1 g tablet 2 TABS AT ONSET OF COLD SORE, REPEAT DOSE IN 12 HOURS ONCE 20 tablet 3    aspirin 81 MG chewable tablet Take 1 tablet by mouth daily      nystatin-triamcinolone (MYCOLOG II) 262738-1.1 UNIT/GM-% cream Apply topically 2 times daily. 1 each 0    SUMAtriptan (IMITREX) 50 MG tablet Take 1 tablet by mouth once as needed for Migraine 9 tablet 1    triamcinolone (KENALOG) 0.1 % cream Apply topically 3 times daily for 7-10 days as needed 60 g 1    vitamin D (ERGOCALCIFEROL) 1.25 MG (70738 UT) CAPS capsule Take 1 capsule by mouth once a week 12 capsule 1    valACYclovir (VALTREX) 500 MG tablet Take 1 tablet by mouth daily 90 tablet 3    clotrimazole-betamethasone (LOTRISONE) 1-0.05 % cream Apply topically 2 times daily. 1 Tube 1     No current facility-administered medications for this visit.      Allergies   Allergen Reactions    Adhesive Tape Other (See Comments)     Red rash    Other Hives     Body glue       All patients pastmedical, surgical, social and

## 2023-11-04 ASSESSMENT — ENCOUNTER SYMPTOMS
WHEEZING: 0
SHORTNESS OF BREATH: 0
COUGH: 0

## 2024-01-16 ENCOUNTER — PATIENT MESSAGE (OUTPATIENT)
Dept: FAMILY MEDICINE CLINIC | Age: 34
End: 2024-01-16

## 2024-01-16 DIAGNOSIS — E55.9 VITAMIN D DEFICIENCY: ICD-10-CM

## 2024-01-17 NOTE — TELEPHONE ENCOUNTER
From: Indira Griffiths  Sent: 1/16/2024 5:17 PM EST  To: [facility] Clinical Staff  Subject: Vit d results     I haven't been. I've been out

## 2024-01-18 RX ORDER — ERGOCALCIFEROL 1.25 MG/1
50000 CAPSULE ORAL WEEKLY
Qty: 12 CAPSULE | Refills: 1 | Status: SHIPPED | OUTPATIENT
Start: 2024-01-18

## 2024-01-31 ENCOUNTER — PATIENT MESSAGE (OUTPATIENT)
Dept: FAMILY MEDICINE CLINIC | Age: 34
End: 2024-01-31

## 2024-01-31 DIAGNOSIS — R11.0 NAUSEA: Primary | ICD-10-CM

## 2024-01-31 RX ORDER — PROMETHAZINE HYDROCHLORIDE 25 MG/1
25 TABLET ORAL 4 TIMES DAILY PRN
Qty: 20 TABLET | Refills: 0 | Status: SHIPPED | OUTPATIENT
Start: 2024-01-31 | End: 2024-02-07

## 2024-01-31 NOTE — TELEPHONE ENCOUNTER
From: Indira Griffiths  To: Ammy Davis  Sent: 1/31/2024 11:33 AM EST  Subject: Nausea from migraine     Would it be possible to get some Phenergan called in for my nausea from my migraine? I've had a migraine since 430 am have taken Tylenol, 2 doses of Maxalt and exendrina migraine nothing is helping and I have nausea. Pharmacare taya please

## 2024-02-02 ASSESSMENT — PATIENT HEALTH QUESTIONNAIRE - PHQ9
SUM OF ALL RESPONSES TO PHQ QUESTIONS 1-9: 13
SUM OF ALL RESPONSES TO PHQ QUESTIONS 1-9: 13
10. IF YOU CHECKED OFF ANY PROBLEMS, HOW DIFFICULT HAVE THESE PROBLEMS MADE IT FOR YOU TO DO YOUR WORK, TAKE CARE OF THINGS AT HOME, OR GET ALONG WITH OTHER PEOPLE: VERY DIFFICULT
3. TROUBLE FALLING OR STAYING ASLEEP: 2
2. FEELING DOWN, DEPRESSED OR HOPELESS: NEARLY EVERY DAY
2. FEELING DOWN, DEPRESSED OR HOPELESS: 3
1. LITTLE INTEREST OR PLEASURE IN DOING THINGS: 3
6. FEELING BAD ABOUT YOURSELF - OR THAT YOU ARE A FAILURE OR HAVE LET YOURSELF OR YOUR FAMILY DOWN: 0
4. FEELING TIRED OR HAVING LITTLE ENERGY: 3
3. TROUBLE FALLING OR STAYING ASLEEP: MORE THAN HALF THE DAYS
8. MOVING OR SPEAKING SO SLOWLY THAT OTHER PEOPLE COULD HAVE NOTICED. OR THE OPPOSITE - BEING SO FIDGETY OR RESTLESS THAT YOU HAVE BEEN MOVING AROUND A LOT MORE THAN USUAL: NOT AT ALL
1. LITTLE INTEREST OR PLEASURE IN DOING THINGS: NEARLY EVERY DAY
9. THOUGHTS THAT YOU WOULD BE BETTER OFF DEAD, OR OF HURTING YOURSELF: NOT AT ALL
SUM OF ALL RESPONSES TO PHQ QUESTIONS 1-9: 13
SUM OF ALL RESPONSES TO PHQ QUESTIONS 1-9: 13
5. POOR APPETITE OR OVEREATING: NOT AT ALL
4. FEELING TIRED OR HAVING LITTLE ENERGY: NEARLY EVERY DAY
7. TROUBLE CONCENTRATING ON THINGS, SUCH AS READING THE NEWSPAPER OR WATCHING TELEVISION: MORE THAN HALF THE DAYS
SUM OF ALL RESPONSES TO PHQ QUESTIONS 1-9: 13
SUM OF ALL RESPONSES TO PHQ9 QUESTIONS 1 & 2: 6
6. FEELING BAD ABOUT YOURSELF - OR THAT YOU ARE A FAILURE OR HAVE LET YOURSELF OR YOUR FAMILY DOWN: NOT AT ALL
9. THOUGHTS THAT YOU WOULD BE BETTER OFF DEAD, OR OF HURTING YOURSELF: 0
5. POOR APPETITE OR OVEREATING: 0
10. IF YOU CHECKED OFF ANY PROBLEMS, HOW DIFFICULT HAVE THESE PROBLEMS MADE IT FOR YOU TO DO YOUR WORK, TAKE CARE OF THINGS AT HOME, OR GET ALONG WITH OTHER PEOPLE: 2
SUM OF ALL RESPONSES TO PHQ9 QUESTIONS 1 & 2: 6
7. TROUBLE CONCENTRATING ON THINGS, SUCH AS READING THE NEWSPAPER OR WATCHING TELEVISION: 2
8. MOVING OR SPEAKING SO SLOWLY THAT OTHER PEOPLE COULD HAVE NOTICED. OR THE OPPOSITE, BEING SO FIGETY OR RESTLESS THAT YOU HAVE BEEN MOVING AROUND A LOT MORE THAN USUAL: 0

## 2024-02-05 ENCOUNTER — OFFICE VISIT (OUTPATIENT)
Dept: FAMILY MEDICINE CLINIC | Age: 34
End: 2024-02-05
Payer: COMMERCIAL

## 2024-02-05 VITALS
DIASTOLIC BLOOD PRESSURE: 80 MMHG | SYSTOLIC BLOOD PRESSURE: 130 MMHG | OXYGEN SATURATION: 98 % | HEIGHT: 62 IN | BODY MASS INDEX: 33.13 KG/M2 | WEIGHT: 180 LBS | HEART RATE: 72 BPM

## 2024-02-05 DIAGNOSIS — F41.9 ANXIETY: Primary | ICD-10-CM

## 2024-02-05 DIAGNOSIS — R53.83 FATIGUE, UNSPECIFIED TYPE: ICD-10-CM

## 2024-02-05 DIAGNOSIS — R06.83 SNORING: ICD-10-CM

## 2024-02-05 PROCEDURE — 99214 OFFICE O/P EST MOD 30 MIN: CPT | Performed by: NURSE PRACTITIONER

## 2024-02-05 RX ORDER — FOLIC ACID 1 MG/1
1 TABLET ORAL DAILY
COMMUNITY

## 2024-02-05 RX ORDER — PROGESTERONE 200 MG/1
CAPSULE ORAL
COMMUNITY
Start: 2024-02-01

## 2024-02-05 RX ORDER — MAGNESIUM 30 MG
30 TABLET ORAL 2 TIMES DAILY
COMMUNITY

## 2024-02-05 ASSESSMENT — ENCOUNTER SYMPTOMS: WHEEZING: 0

## 2024-02-05 NOTE — PROGRESS NOTES
Attention and perception normal.         Mood and Affect: Mood is depressed.         Speech: Speech normal.         Behavior: Behavior normal.         Thought Content: Thought content normal.         Cognition and Memory: Cognition and memory normal.         Judgment: Judgment normal.                An electronic signature was used to authenticate this note.    --LAWANDA Sylvester - CNP

## 2024-02-07 DIAGNOSIS — G47.30 SLEEP APNEA, UNSPECIFIED TYPE: Primary | ICD-10-CM

## 2024-02-13 ENCOUNTER — PATIENT MESSAGE (OUTPATIENT)
Dept: FAMILY MEDICINE CLINIC | Age: 34
End: 2024-02-13

## 2024-02-13 RX ORDER — SUMATRIPTAN 50 MG/1
50 TABLET, FILM COATED ORAL
Qty: 10 TABLET | Refills: 3 | Status: SHIPPED | OUTPATIENT
Start: 2024-02-13 | End: 2024-02-13

## 2024-02-13 RX ORDER — RIZATRIPTAN BENZOATE 10 MG/1
10 TABLET ORAL
Qty: 10 TABLET | Refills: 1 | Status: CANCELLED | OUTPATIENT
Start: 2024-02-13 | End: 2024-02-13

## 2024-02-13 NOTE — TELEPHONE ENCOUNTER
From: Indira Griffiths  To: Ammy Davis  Sent: 2/13/2024 11:32 AM EST  Subject: Refill     I need a refill on my maxisalt

## 2024-02-13 NOTE — TELEPHONE ENCOUNTER
Indira called requesting a refill of the below medication which has been pended for you:     Requested Prescriptions     Pending Prescriptions Disp Refills    SUMAtriptan (IMITREX) 50 MG tablet 10 tablet 0     Sig: Take 1 tablet by mouth once as needed for Migraine       Last Appointment Date: 2/5/2024  Next Appointment Date: Visit date not found    Allergies   Allergen Reactions    Adhesive Tape Other (See Comments)     Red rash    Other Hives     Body glue

## 2024-03-12 ENCOUNTER — PATIENT MESSAGE (OUTPATIENT)
Dept: FAMILY MEDICINE CLINIC | Age: 34
End: 2024-03-12

## 2024-03-12 DIAGNOSIS — R73.01 IMPAIRED FASTING GLUCOSE: Primary | ICD-10-CM

## 2024-03-12 NOTE — TELEPHONE ENCOUNTER
From: Indira Griffiths  To: Ammy Davis  Sent: 3/12/2024 2:16 PM EDT  Subject: Labs     Can you put in an order for me to get my a1c checked

## 2024-03-20 ENCOUNTER — HOSPITAL ENCOUNTER (OUTPATIENT)
Dept: SLEEP CENTER | Age: 34
Discharge: HOME | End: 2024-03-22
Payer: COMMERCIAL

## 2024-03-20 DIAGNOSIS — G47.30 SLEEP APNEA, UNSPECIFIED TYPE: ICD-10-CM

## 2024-03-20 PROCEDURE — G0399 HOME SLEEP TEST/TYPE 3 PORTA: HCPCS

## 2024-03-20 NOTE — PROGRESS NOTES
Patient came in was shown how to use the apnea link air SN# [id number]. Patient had no questions and spouse will be bring back the next day.

## 2024-03-22 ENCOUNTER — PATIENT MESSAGE (OUTPATIENT)
Dept: FAMILY MEDICINE CLINIC | Age: 34
End: 2024-03-22

## 2024-03-22 NOTE — TELEPHONE ENCOUNTER
From: Indira Griffiths  To: Ammy Davis  Sent: 3/22/2024 10:31 AM EDT  Subject: Trulicity    Can we try my on trulicity please

## 2024-03-23 NOTE — PROGRESS NOTES
[address] [place] [address]                           SLEEP CENTER REPORT      PATIENT NAME: MERLENE CARREON              : 1990  MED REC NO: [id number]                         ROOM:   ACCOUNT NO: [id number]                       ADMIT DATE: 2024  PROVIDER: Floresita Banks MD      SLEEP EVALUATION    DATE OF STUDY:  2024    REFERRING PHYSICIAN:  MONIQUE KEYS      CLINICAL IMPRESSION:  Obstructive sleep apnea syndrome.    PROCEDURE:  It was a 1-night ApneaLink home study.    PROCEDURE DETAILS:  The sleep/wake evaluation consisted of an intensive intake interview, 1 night of home sleep testing.    The standard montage for home sleep testing included the ApneaLink Air.  The respiratory battery consisted of measurements of simultaneous recording of ventilation (oronasal thermal airflow), respiratory effort (single thoracoabdominal piezo belt), ECG or heart rate, oxygen saturation (finger oximetry).    Sleep evaluation was started at 10:18 p.m. and finished at 7:35 a.m. for a total recording of 9 hours and 18 minutes.    Apnea-hypopnea index was less than 1.  Respiratory disturbance index was 7.  The lowest oxygen saturation was 94%.    IMPRESSION:  The sleep evaluation done in the home could not reveal any significant degree of apnea.  The patient has a history of snoring.  If the clinical impression of apnea is significant, then an in-lab supervised sleep study should be performed to further evaluate the patient for possible apnea.  This is the recommendation from the American Society of Sleep Medicine as well.        FLORESITA BANKS MD    D:  2024 13:58:37     T:  2024 02:47:11     JEWEL/BEATRIZ  Job #:  [id number]     Doc#:  [id number]

## 2024-03-28 NOTE — TELEPHONE ENCOUNTER
Is she wanting this for weight loss? If she is it may not be available due to national shortage. I would advise her to call the pharmacy and see what their status is.

## 2024-03-29 NOTE — TELEPHONE ENCOUNTER
Trulicity is only approved for Type 2 diabetes not for weight loss. We could do wegovy if she wants.

## 2024-04-02 PROBLEM — E28.2 POLYCYSTIC OVARY SYNDROME: Status: ACTIVE | Noted: 2023-03-20

## 2024-04-02 PROBLEM — N73.6 PELVIC ADHESIONS: Status: ACTIVE | Noted: 2023-03-20

## 2024-04-02 PROBLEM — R14.0 BLOATING: Status: ACTIVE | Noted: 2022-12-02

## 2024-04-02 PROBLEM — N88.3: Status: ACTIVE | Noted: 2021-12-06

## 2024-04-02 PROBLEM — K21.9 GASTROESOPHAGEAL REFLUX DISEASE WITHOUT ESOPHAGITIS: Status: ACTIVE | Noted: 2022-12-02

## 2024-04-02 PROBLEM — O03.9 MISCARRIAGE: Status: ACTIVE | Noted: 2023-11-30

## 2024-04-02 PROBLEM — G43.909 MIGRAINE HEADACHE: Status: ACTIVE | Noted: 2023-03-20

## 2024-04-02 PROBLEM — R53.83 FATIGUE: Status: ACTIVE | Noted: 2023-03-20

## 2024-04-02 PROBLEM — R10.2 PAIN IN PELVIS: Status: ACTIVE | Noted: 2021-12-06

## 2024-04-02 PROBLEM — R10.2 PELVIC PAIN IN FEMALE: Status: ACTIVE | Noted: 2023-03-20

## 2024-04-02 PROBLEM — G43.009 MIGRAINE WITHOUT AURA AND WITHOUT STATUS MIGRAINOSUS, NOT INTRACTABLE: Status: ACTIVE | Noted: 2022-10-20

## 2024-04-02 PROBLEM — H52.13 MYOPIA OF BOTH EYES: Status: ACTIVE | Noted: 2020-10-05

## 2024-04-02 PROBLEM — R60.0 LOCALIZED EDEMA: Status: ACTIVE | Noted: 2022-04-08

## 2024-04-02 PROBLEM — E34.9 DISORDER OF ENDOCRINE SYSTEM: Status: ACTIVE | Noted: 2023-08-22

## 2024-04-02 PROBLEM — E03.9 HYPOTHYROIDISM: Status: ACTIVE | Noted: 2023-03-20

## 2024-04-02 PROBLEM — R35.0 INCREASED FREQUENCY OF URINATION: Status: ACTIVE | Noted: 2023-03-20

## 2024-04-02 PROBLEM — N73.6 FEMALE PELVIC PERITONEAL ADHESIONS: Status: ACTIVE | Noted: 2022-03-02

## 2024-04-10 DIAGNOSIS — R11.0 NAUSEA: ICD-10-CM

## 2024-04-10 DIAGNOSIS — B00.1 HERPES LABIALIS: ICD-10-CM

## 2024-04-10 NOTE — TELEPHONE ENCOUNTER
Indira called requesting a refill of the below medication which has been pended for you:     Requested Prescriptions     Pending Prescriptions Disp Refills    valACYclovir (VALTREX) 1 g tablet [Pharmacy Med Name: valACYclovir 1 gram tablet (VALTREX)] 20 tablet 3     Sig: TAKE 2 TABLETS AT ONSET OF COLD SORE, REPEAT DOSE IN 12 HOURS ONCE       Last Appointment Date: 2/5/2024  Next Appointment Date: 4/10/2024    Allergies   Allergen Reactions    Adhesive Tape Other (See Comments)     Red rash    Other Hives     Body glue

## 2024-04-10 NOTE — TELEPHONE ENCOUNTER
Indira called requesting a refill of the below medication which has been pended for you:     Requested Prescriptions     Pending Prescriptions Disp Refills    promethazine (PHENERGAN) 25 MG tablet [Pharmacy Med Name: promethazine 25 mg tablet (PHENERGAN)] 20 tablet 0     Sig: Take 1 tablet (25 mg total) by mouth 4 (four) times daily AS NEEDED FOR NAUSEA       Last Appointment Date: 2/5/2024  Next Appointment Date: Visit date not found    Allergies   Allergen Reactions    Adhesive Tape Other (See Comments)     Red rash    Other Hives     Body glue

## 2024-04-11 RX ORDER — VALACYCLOVIR HYDROCHLORIDE 1 G/1
TABLET, FILM COATED ORAL
Qty: 20 TABLET | Refills: 3 | Status: SHIPPED | OUTPATIENT
Start: 2024-04-11

## 2024-04-11 RX ORDER — PROMETHAZINE HYDROCHLORIDE 25 MG/1
TABLET ORAL
Qty: 20 TABLET | Refills: 0 | Status: SHIPPED | OUTPATIENT
Start: 2024-04-11

## 2024-04-12 ENCOUNTER — PATIENT MESSAGE (OUTPATIENT)
Dept: FAMILY MEDICINE CLINIC | Age: 34
End: 2024-04-12

## 2024-04-12 RX ORDER — SERTRALINE HYDROCHLORIDE 25 MG/1
25 TABLET, FILM COATED ORAL DAILY
Qty: 30 TABLET | Refills: 3 | Status: SHIPPED | OUTPATIENT
Start: 2024-04-12

## 2024-04-12 NOTE — TELEPHONE ENCOUNTER
From: Indira Griffiths  To: Ammy Davis  Sent: 4/12/2024 7:35 AM EDT  Subject: Zolfot    I've only been taking a 1/2tab this whole time. Its working can you send in the 25mg instead of me breaking in half

## 2024-04-16 ENCOUNTER — TELEMEDICINE (OUTPATIENT)
Dept: FAMILY MEDICINE CLINIC | Age: 34
End: 2024-04-16
Payer: COMMERCIAL

## 2024-04-16 DIAGNOSIS — G43.919 INTRACTABLE MIGRAINE WITHOUT STATUS MIGRAINOSUS, UNSPECIFIED MIGRAINE TYPE: ICD-10-CM

## 2024-04-16 DIAGNOSIS — R53.83 FATIGUE, UNSPECIFIED TYPE: ICD-10-CM

## 2024-04-16 DIAGNOSIS — R11.2 NAUSEA AND VOMITING, UNSPECIFIED VOMITING TYPE: Primary | ICD-10-CM

## 2024-04-16 DIAGNOSIS — Z13.220 LIPID SCREENING: ICD-10-CM

## 2024-04-16 PROCEDURE — 99214 OFFICE O/P EST MOD 30 MIN: CPT | Performed by: NURSE PRACTITIONER

## 2024-04-16 RX ORDER — UBROGEPANT 50 MG/1
TABLET ORAL
Qty: 30 TABLET | Refills: 3 | Status: SHIPPED | OUTPATIENT
Start: 2024-04-16

## 2024-04-16 SDOH — ECONOMIC STABILITY: FOOD INSECURITY: WITHIN THE PAST 12 MONTHS, YOU WORRIED THAT YOUR FOOD WOULD RUN OUT BEFORE YOU GOT MONEY TO BUY MORE.: NEVER TRUE

## 2024-04-16 SDOH — ECONOMIC STABILITY: FOOD INSECURITY: WITHIN THE PAST 12 MONTHS, THE FOOD YOU BOUGHT JUST DIDN'T LAST AND YOU DIDN'T HAVE MONEY TO GET MORE.: NEVER TRUE

## 2024-04-16 SDOH — ECONOMIC STABILITY: INCOME INSECURITY: HOW HARD IS IT FOR YOU TO PAY FOR THE VERY BASICS LIKE FOOD, HOUSING, MEDICAL CARE, AND HEATING?: NOT HARD AT ALL

## 2024-04-16 ASSESSMENT — ENCOUNTER SYMPTOMS
VOMITING: 1
COUGH: 0
DIARRHEA: 0
CONSTIPATION: 0
NAUSEA: 1
WHEEZING: 0
SHORTNESS OF BREATH: 0

## 2024-04-16 NOTE — PROGRESS NOTES
Indira Griffiths, was evaluated through a synchronous (real-time) audio-video encounter. The patient (or guardian if applicable) is aware that this is a billable service, which includes applicable co-pays. This Virtual Visit was conducted with patient's (and/or legal guardian's) consent. Patient identification was verified, and a caregiver was present when appropriate.   The patient was located at Home: [address] [place] [address]  Provider was located at Home (Appt Dept State): [place]  Confirm you are appropriately licensed, registered, or certified to deliver care in the state where the patient is located as indicated above. If you are not or unsure, please re-schedule the visit: Yes, I confirm.     Indira Griffiths (:  1990) is a Established patient, presenting virtually for evaluation of the following:    Assessment & Plan   Below is the assessment and plan developed based on review of pertinent history, physical exam, labs, studies, and medications.  1. Nausea and vomiting, unspecified vomiting type-labs ordered continue phenergan  -     Comprehensive Metabolic Panel; Future  2. Intractable migraine without status migrainosus, unspecified migraine type-will try ubrelvy as ordered  3. Fatigue, unspecified type-labs ordered  -     CBC with Auto Differential; Future  -     Comprehensive Metabolic Panel; Future  4. Lipid screening-labs ordered  -     Lipid Panel; Future  Pt to follow up in 3 months for follow up of medication   Pt to return PRN   No follow-ups on file.       Subjective   Pt presents to the clinic via a virtual visit to discuss her headaches and fatigue. Pt states that this has been going on for about 2 weeks. She is extremely fatigued and nauseated. She states that the only time she has felt like this was when she has been pregnant however pt had taken multiple pregnancy test and had a hcg qualitative which was negative. She has take the her medication for these symptoms. She states that

## 2024-05-02 ENCOUNTER — OFFICE VISIT (OUTPATIENT)
Dept: PRIMARY CARE CLINIC | Age: 34
End: 2024-05-02
Payer: COMMERCIAL

## 2024-05-02 ENCOUNTER — PATIENT MESSAGE (OUTPATIENT)
Dept: FAMILY MEDICINE CLINIC | Age: 34
End: 2024-05-02

## 2024-05-02 VITALS
HEART RATE: 60 BPM | SYSTOLIC BLOOD PRESSURE: 118 MMHG | BODY MASS INDEX: 35.59 KG/M2 | DIASTOLIC BLOOD PRESSURE: 78 MMHG | WEIGHT: 193.38 LBS | RESPIRATION RATE: 15 BRPM | OXYGEN SATURATION: 99 % | HEIGHT: 62 IN | TEMPERATURE: 98.1 F

## 2024-05-02 DIAGNOSIS — F41.9 ANXIETY: Primary | ICD-10-CM

## 2024-05-02 DIAGNOSIS — S05.01XA ABRASION OF RIGHT CORNEA, INITIAL ENCOUNTER: Primary | ICD-10-CM

## 2024-05-02 PROCEDURE — 99213 OFFICE O/P EST LOW 20 MIN: CPT

## 2024-05-02 RX ORDER — OFLOXACIN 3 MG/ML
1 SOLUTION/ DROPS OPHTHALMIC 4 TIMES DAILY
Qty: 10 ML | Refills: 0 | Status: SHIPPED | OUTPATIENT
Start: 2024-05-02 | End: 2024-05-12

## 2024-05-02 ASSESSMENT — ENCOUNTER SYMPTOMS
ALLERGIC/IMMUNOLOGIC NEGATIVE: 1
FOREIGN BODY SENSATION: 0
EYE ITCHING: 1
EYE PAIN: 1
EYE REDNESS: 0
EYE DISCHARGE: 1
BLURRED VISION: 0
PHOTOPHOBIA: 1
DOUBLE VISION: 0
RESPIRATORY NEGATIVE: 1
GASTROINTESTINAL NEGATIVE: 1

## 2024-05-02 ASSESSMENT — VISUAL ACUITY: OU: 1

## 2024-05-02 NOTE — TELEPHONE ENCOUNTER
From: Indira Griffiths  To: Ammy Davis  Sent: 5/2/2024 10:07 AM EDT  Subject: [facility]     If you send it in will u send it to [place].   I'm know your not supposed to know this but I've used it before from someone at work and it worked great. Just need it for baseball season so end of June

## 2024-05-02 NOTE — PATIENT INSTRUCTIONS
Apply medication as prescribed  Avoid contact wear until healed  Use warm compress to eye   Avoid rubbing or scratching eyes  If symptoms worsen follow up with PCP  Patient verbalized understanding and agrees with plan of care

## 2024-05-02 NOTE — PROGRESS NOTES
[facility]  [facility]  [address]  [facility] [address]  Dept: [phone]  Dept Fax: [phone]    Indira Griffiths  is a 33 y.o. female who presents today for her medical conditions/complaints as noted below.  Indira Griffiths is c/o of   Chief Complaint   Patient presents with    Eye Pain     Right eye pain since yesterday and watery        HPI:     Eye Pain   The right eye is affected. This is a new problem. The current episode started yesterday. The problem occurs constantly. The problem has been unchanged. The injury mechanism was contact lenses. The pain is at a severity of 4/10. The pain is mild. There is No known exposure to pink eye. She Wears contacts. Associated symptoms include an eye discharge (watery), itching and photophobia. Pertinent negatives include no blurred vision, double vision, eye redness, fever or foreign body sensation. Treatments tried: ibuprofen. The treatment provided no relief.         Past Medical History:   Diagnosis Date    Abnormal Pap smear of cervix 09/22/2014    HPV    Breast disorder     sore breasts increased caffeine use    Chicken pox 12/1992    Depression     Disorder of endocrine system 8/22/2023    Herpes simplex virus (HSV) infection     Hiatal hernia 2016    Miscarriage     09/2023    MTHFR gene mutation     09/2023    Postpartum depression     Rh sensitized     Vaginal delivery 2012     Past Surgical History:   Procedure Laterality Date    COLONOSCOPY  05/25/2016    [facility] Dr Judd     HYSTEROSCOPY  02/17/2022    with D and C    LAPAROSCOPY  02/17/2022    diagnostic    LAPAROSCOPY Bilateral 05/27/2022    of adhesions with right fimbrimbrobloblasty    UPPER GASTROINTESTINAL ENDOSCOPY  05/25/2016    XR HYSTEROSALPINGOGRAM INJECTION Bilateral 02/17/2022    shsg with bilateral tcft       Family History   Problem Relation Age of Onset

## 2024-05-06 RX ORDER — ALPRAZOLAM 0.25 MG/1
0.25 TABLET ORAL 2 TIMES DAILY PRN
Qty: 30 TABLET | Refills: 0 | Status: SHIPPED | OUTPATIENT
Start: 2024-05-06 | End: 2024-06-05

## 2024-05-06 NOTE — TELEPHONE ENCOUNTER
Medication sent to the [facility].    Controlled Substance Monitoring:    Acute and Chronic Pain Monitoring:        No data to display

## 2024-06-11 ENCOUNTER — OFFICE VISIT (OUTPATIENT)
Dept: FAMILY MEDICINE CLINIC | Age: 34
End: 2024-06-11
Payer: COMMERCIAL

## 2024-06-11 VITALS
HEART RATE: 80 BPM | DIASTOLIC BLOOD PRESSURE: 80 MMHG | WEIGHT: 194 LBS | OXYGEN SATURATION: 98 % | SYSTOLIC BLOOD PRESSURE: 120 MMHG | HEIGHT: 62 IN | BODY MASS INDEX: 35.7 KG/M2

## 2024-06-11 DIAGNOSIS — F41.9 ANXIETY: Primary | ICD-10-CM

## 2024-06-11 DIAGNOSIS — G43.919 INTRACTABLE MIGRAINE WITHOUT STATUS MIGRAINOSUS, UNSPECIFIED MIGRAINE TYPE: ICD-10-CM

## 2024-06-11 DIAGNOSIS — R60.9 SWELLING: ICD-10-CM

## 2024-06-11 DIAGNOSIS — Z13.31 POSITIVE DEPRESSION SCREENING: ICD-10-CM

## 2024-06-11 PROCEDURE — 99214 OFFICE O/P EST MOD 30 MIN: CPT | Performed by: NURSE PRACTITIONER

## 2024-06-11 RX ORDER — FUROSEMIDE 20 MG/1
20 TABLET ORAL
Qty: 15 TABLET | Refills: 1 | Status: SHIPPED | OUTPATIENT
Start: 2024-06-12

## 2024-06-11 RX ORDER — BUPROPION HYDROCHLORIDE 150 MG/1
150 TABLET ORAL EVERY MORNING
Qty: 30 TABLET | Refills: 5 | Status: SHIPPED | OUTPATIENT
Start: 2024-06-11

## 2024-06-11 SDOH — ECONOMIC STABILITY: INCOME INSECURITY: HOW HARD IS IT FOR YOU TO PAY FOR THE VERY BASICS LIKE FOOD, HOUSING, MEDICAL CARE, AND HEATING?: NOT HARD AT ALL

## 2024-06-11 SDOH — ECONOMIC STABILITY: FOOD INSECURITY: WITHIN THE PAST 12 MONTHS, THE FOOD YOU BOUGHT JUST DIDN'T LAST AND YOU DIDN'T HAVE MONEY TO GET MORE.: NEVER TRUE

## 2024-06-11 SDOH — ECONOMIC STABILITY: FOOD INSECURITY: WITHIN THE PAST 12 MONTHS, YOU WORRIED THAT YOUR FOOD WOULD RUN OUT BEFORE YOU GOT MONEY TO BUY MORE.: NEVER TRUE

## 2024-06-11 ASSESSMENT — ANXIETY QUESTIONNAIRES
6. BECOMING EASILY ANNOYED OR IRRITABLE: NEARLY EVERY DAY
5. BEING SO RESTLESS THAT IT IS HARD TO SIT STILL: NOT AT ALL
1. FEELING NERVOUS, ANXIOUS, OR ON EDGE: NEARLY EVERY DAY
3. WORRYING TOO MUCH ABOUT DIFFERENT THINGS: NEARLY EVERY DAY
4. TROUBLE RELAXING: MORE THAN HALF THE DAYS
2. NOT BEING ABLE TO STOP OR CONTROL WORRYING: MORE THAN HALF THE DAYS
GAD7 TOTAL SCORE: 13
7. FEELING AFRAID AS IF SOMETHING AWFUL MIGHT HAPPEN: NOT AT ALL
IF YOU CHECKED OFF ANY PROBLEMS ON THIS QUESTIONNAIRE, HOW DIFFICULT HAVE THESE PROBLEMS MADE IT FOR YOU TO DO YOUR WORK, TAKE CARE OF THINGS AT HOME, OR GET ALONG WITH OTHER PEOPLE: VERY DIFFICULT

## 2024-06-11 ASSESSMENT — ENCOUNTER SYMPTOMS
COUGH: 0
SHORTNESS OF BREATH: 0
WHEEZING: 0
RHINORRHEA: 0
SINUS PRESSURE: 0

## 2024-06-11 ASSESSMENT — PATIENT HEALTH QUESTIONNAIRE - PHQ9
SUM OF ALL RESPONSES TO PHQ QUESTIONS 1-9: 10
SUM OF ALL RESPONSES TO PHQ QUESTIONS 1-9: 10
9. THOUGHTS THAT YOU WOULD BE BETTER OFF DEAD, OR OF HURTING YOURSELF: NOT AT ALL
SUM OF ALL RESPONSES TO PHQ9 QUESTIONS 1 & 2: 2
5. POOR APPETITE OR OVEREATING: NOT AT ALL
SUM OF ALL RESPONSES TO PHQ QUESTIONS 1-9: 10
2. FEELING DOWN, DEPRESSED OR HOPELESS: SEVERAL DAYS
10. IF YOU CHECKED OFF ANY PROBLEMS, HOW DIFFICULT HAVE THESE PROBLEMS MADE IT FOR YOU TO DO YOUR WORK, TAKE CARE OF THINGS AT HOME, OR GET ALONG WITH OTHER PEOPLE: VERY DIFFICULT
7. TROUBLE CONCENTRATING ON THINGS, SUCH AS READING THE NEWSPAPER OR WATCHING TELEVISION: SEVERAL DAYS
4. FEELING TIRED OR HAVING LITTLE ENERGY: NEARLY EVERY DAY
1. LITTLE INTEREST OR PLEASURE IN DOING THINGS: SEVERAL DAYS
3. TROUBLE FALLING OR STAYING ASLEEP: NEARLY EVERY DAY
8. MOVING OR SPEAKING SO SLOWLY THAT OTHER PEOPLE COULD HAVE NOTICED. OR THE OPPOSITE, BEING SO FIGETY OR RESTLESS THAT YOU HAVE BEEN MOVING AROUND A LOT MORE THAN USUAL: NOT AT ALL
SUM OF ALL RESPONSES TO PHQ QUESTIONS 1-9: 10
6. FEELING BAD ABOUT YOURSELF - OR THAT YOU ARE A FAILURE OR HAVE LET YOURSELF OR YOUR FAMILY DOWN: SEVERAL DAYS

## 2024-06-11 NOTE — PROGRESS NOTES
10), additional evaluation and assessment performed, follow-up plan includes but not limited to: Medication management and Referral to /Specialist  for evaluation and management.

## 2024-07-22 ENCOUNTER — PATIENT MESSAGE (OUTPATIENT)
Dept: FAMILY MEDICINE CLINIC | Age: 34
End: 2024-07-22

## 2024-07-22 RX ORDER — AMOXICILLIN 875 MG/1
875 TABLET, COATED ORAL 2 TIMES DAILY
Qty: 20 TABLET | Refills: 0 | Status: SHIPPED | OUTPATIENT
Start: 2024-07-22 | End: 2024-08-01

## 2024-07-22 NOTE — TELEPHONE ENCOUNTER
From: Indira Griffiths  To: Ammy Davis  Sent: 7/22/2024 11:52 AM EDT  Subject: Strep     I took a test at work, can you please send something in? [facility] 11 please

## 2024-09-10 ENCOUNTER — NURSE ONLY (OUTPATIENT)
Dept: LAB | Age: 34
End: 2024-09-10
Payer: COMMERCIAL

## 2024-09-10 ENCOUNTER — OFFICE VISIT (OUTPATIENT)
Dept: FAMILY MEDICINE CLINIC | Age: 34
End: 2024-09-10
Payer: COMMERCIAL

## 2024-09-10 VITALS
HEART RATE: 80 BPM | BODY MASS INDEX: 38.46 KG/M2 | SYSTOLIC BLOOD PRESSURE: 122 MMHG | DIASTOLIC BLOOD PRESSURE: 68 MMHG | OXYGEN SATURATION: 97 % | HEIGHT: 62 IN | WEIGHT: 209 LBS

## 2024-09-10 DIAGNOSIS — R68.89 FORGETFULNESS: ICD-10-CM

## 2024-09-10 DIAGNOSIS — R41.840 DIFFICULTY CONCENTRATING: ICD-10-CM

## 2024-09-10 DIAGNOSIS — Z23 NEED FOR VACCINATION: ICD-10-CM

## 2024-09-10 DIAGNOSIS — Z23 NEED FOR TDAP VACCINATION: Primary | ICD-10-CM

## 2024-09-10 DIAGNOSIS — F41.9 ANXIETY: Primary | ICD-10-CM

## 2024-09-10 DIAGNOSIS — N89.8 VAGINAL IRRITATION: ICD-10-CM

## 2024-09-10 PROCEDURE — 99214 OFFICE O/P EST MOD 30 MIN: CPT | Performed by: NURSE PRACTITIONER

## 2024-09-10 PROCEDURE — 90715 TDAP VACCINE 7 YRS/> IM: CPT | Performed by: NURSE PRACTITIONER

## 2024-09-10 PROCEDURE — 90471 IMMUNIZATION ADMIN: CPT | Performed by: NURSE PRACTITIONER

## 2024-09-17 ASSESSMENT — ENCOUNTER SYMPTOMS
WHEEZING: 0
SHORTNESS OF BREATH: 0
COUGH: 0

## 2024-12-13 ENCOUNTER — PATIENT MESSAGE (OUTPATIENT)
Dept: FAMILY MEDICINE CLINIC | Age: 34
End: 2024-12-13

## 2024-12-13 DIAGNOSIS — R73.09 ELEVATED GLUCOSE: Primary | ICD-10-CM

## 2025-01-03 ENCOUNTER — PATIENT MESSAGE (OUTPATIENT)
Dept: FAMILY MEDICINE CLINIC | Age: 35
End: 2025-01-03

## 2025-01-03 RX ORDER — TIRZEPATIDE 2.5 MG/.5ML
2.5 INJECTION, SOLUTION SUBCUTANEOUS WEEKLY
Qty: 2 ML | Refills: 0 | Status: SHIPPED | OUTPATIENT
Start: 2025-01-03

## 2025-01-29 DIAGNOSIS — E66.9 OBESITY (BMI 30-39.9): Primary | ICD-10-CM

## 2025-01-29 RX ORDER — TIRZEPATIDE 2.5 MG/.5ML
2.5 INJECTION, SOLUTION SUBCUTANEOUS WEEKLY
Qty: 2 ML | Refills: 0 | Status: SHIPPED | OUTPATIENT
Start: 2025-01-29

## 2025-02-18 ENCOUNTER — PATIENT MESSAGE (OUTPATIENT)
Dept: FAMILY MEDICINE CLINIC | Age: 35
End: 2025-02-18

## 2025-02-18 NOTE — TELEPHONE ENCOUNTER
Pt tolerating 2.5mg well, would like to increase to 5mg. Establishes with new provider next month due to insurance change. Order pended.